# Patient Record
Sex: MALE | Race: WHITE
[De-identification: names, ages, dates, MRNs, and addresses within clinical notes are randomized per-mention and may not be internally consistent; named-entity substitution may affect disease eponyms.]

---

## 2017-10-16 ENCOUNTER — HOSPITAL ENCOUNTER (EMERGENCY)
Dept: HOSPITAL 25 - ED | Age: 82
Discharge: HOME | End: 2017-10-16
Payer: MEDICARE

## 2017-10-16 VITALS — DIASTOLIC BLOOD PRESSURE: 43 MMHG | SYSTOLIC BLOOD PRESSURE: 150 MMHG

## 2017-10-16 DIAGNOSIS — Z79.4: ICD-10-CM

## 2017-10-16 DIAGNOSIS — E11.649: Primary | ICD-10-CM

## 2017-10-16 DIAGNOSIS — R56.9: ICD-10-CM
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
## 2017-10-16 DIAGNOSIS — Z86.79: ICD-10-CM

## 2017-10-16 DIAGNOSIS — T38.3X5A: ICD-10-CM

## 2017-10-16 LAB
ALBUMIN SERPL BCG-MCNC: 3.9 G/DL (ref 3.2–5.2)
ALP SERPL-CCNC: 56 U/L (ref 34–104)
ALT SERPL W P-5'-P-CCNC: 12 U/L (ref 7–52)
ANION GAP SERPL CALC-SCNC: 9 MMOL/L (ref 2–11)
AST SERPL-CCNC: 17 U/L (ref 13–39)
BUN SERPL-MCNC: 34 MG/DL (ref 6–24)
BUN/CREAT SERPL: 23.8 (ref 8–20)
CALCIUM SERPL-MCNC: 9 MG/DL (ref 8.6–10.3)
CHLORIDE SERPL-SCNC: 105 MMOL/L (ref 101–111)
GLOBULIN SER CALC-MCNC: 3.1 G/DL (ref 2–4)
GLUCOSE SERPL-MCNC: 42 MG/DL (ref 70–100)
HCO3 SERPL-SCNC: 21 MMOL/L (ref 22–32)
HCT VFR BLD AUTO: 38 % (ref 42–52)
HGB BLD-MCNC: 12.9 G/DL (ref 14–18)
LIPASE SERPL-CCNC: 24 U/L (ref 11–82)
MAGNESIUM SERPL-MCNC: 1.7 MG/DL (ref 1.9–2.7)
MCH RBC QN AUTO: 30 PG (ref 27–31)
MCHC RBC AUTO-ENTMCNC: 34 G/DL (ref 31–36)
MCV RBC AUTO: 87 FL (ref 80–94)
POTASSIUM SERPL-SCNC: 3.1 MMOL/L (ref 3.5–5)
PROT SERPL-MCNC: 7 G/DL (ref 6.4–8.9)
RBC # BLD AUTO: 4.39 10^6/UL (ref 4–5.4)
SODIUM SERPL-SCNC: 135 MMOL/L (ref 133–145)
TSH SERPL-ACNC: 5.03 MCIU/ML (ref 0.34–5.6)
WBC # BLD AUTO: 11.7 10^3/UL (ref 3.5–10.8)

## 2017-10-16 PROCEDURE — 84443 ASSAY THYROID STIM HORMONE: CPT

## 2017-10-16 PROCEDURE — 81003 URINALYSIS AUTO W/O SCOPE: CPT

## 2017-10-16 PROCEDURE — 86140 C-REACTIVE PROTEIN: CPT

## 2017-10-16 PROCEDURE — 83605 ASSAY OF LACTIC ACID: CPT

## 2017-10-16 PROCEDURE — 83735 ASSAY OF MAGNESIUM: CPT

## 2017-10-16 PROCEDURE — 70450 CT HEAD/BRAIN W/O DYE: CPT

## 2017-10-16 PROCEDURE — 85025 COMPLETE CBC W/AUTO DIFF WBC: CPT

## 2017-10-16 PROCEDURE — 36415 COLL VENOUS BLD VENIPUNCTURE: CPT

## 2017-10-16 PROCEDURE — 80053 COMPREHEN METABOLIC PANEL: CPT

## 2017-10-16 PROCEDURE — 93005 ELECTROCARDIOGRAM TRACING: CPT

## 2017-10-16 PROCEDURE — 85610 PROTHROMBIN TIME: CPT

## 2017-10-16 PROCEDURE — 85730 THROMBOPLASTIN TIME PARTIAL: CPT

## 2017-10-16 PROCEDURE — 99283 EMERGENCY DEPT VISIT LOW MDM: CPT

## 2017-10-16 PROCEDURE — 81015 MICROSCOPIC EXAM OF URINE: CPT

## 2017-10-16 PROCEDURE — 83690 ASSAY OF LIPASE: CPT

## 2017-10-16 PROCEDURE — 83880 ASSAY OF NATRIURETIC PEPTIDE: CPT

## 2017-10-16 NOTE — RAD
Indication: First-time seizure. Hit head.



Comparison: No relevant prior exams available on the [facility] [place], [place] PACS for comparison.



Technique: Noncontrast CT vertex of skull through foramen magnum.



Report: Moderate prominence of the cerebral sulci and cerebellar fissures reflecting

atrophy. Negative for ventriculomegaly. Patent basal cisterns. Negative for gray matter

white matter obscuration, intra or extra-axial hemorrhage, or mass effect. Decreased

density in the periventricular and subcortical white matter while non-specific is most

likely due to chronic microangiopathy. 



Unremarkable orbital contents.



Atherosclerotic calcification of the intracranial internal carotid arteries and vertebral

arteries at the skull base.



No suspicious calvarial or skull base lesions evident. Negative for scalp hematoma.



IMPRESSION: 

1. No traumatic injury or acute intracranial process evident.

2. Atrophy and stigmata of chronic small vessel ischemic disease.

## 2017-10-16 NOTE — ED
Mata PATRICK Nikita, scribed for Leno Jansen MD on 10/16/17 at 0441 .





Neurological HPI





- HPI Summary


HPI Summary: 





This patient is an 83 year old M BIBA to ED with a chief complaint of seizure 

at 0230. Family found pt seizing on the kitchen floor. Per EMS, pt's BS was 31 

on arrival and they give 25gms of D10, 2nd finger stick was 71. 18 ga SL to 

left hand. The patient rates the pain 0/10 in severity. Symptoms aggravated by 

nothing. Symptoms alleviated by nothing. Family reports head trauma. Patient 

denies head pain. This was pts first seizure.





- History of Current Complaint


Chief Complaint: EDSeizure


Stated Complaint: SEIZURE


Time Seen by Provider: 10/16/17 04:32


Hx Obtained From: Patient, Family/Caretaker


Onset/Duration: Sudden Onset, Started hours ago - 0230, Resolved


Current Severity: None


Number of Seizures: 1


Pain Intensity: 0


Pain Scale Used: 0-10 Numeric


Aggravating: Nothing


Alleviating: Nothing





- Allergy/Home Medications


Allergies/Adverse Reactions: 


 Allergies











Allergy/AdvReac Type Severity Reaction Status Date / Time


 


Bacitracin [From Neosporin] Allergy  Hives Verified 07/10/13 07:32


 


Neomycin [From Neosporin] Allergy  Hives Verified 07/10/13 07:32


 


Polymyxin B [From Neosporin] Allergy  Hives Verified 07/10/13 07:32


 


IMAGING DYE Allergy  Hives Uncoded 07/10/13 07:32














PMH/Surg Hx/FS Hx/Imm Hx


Endocrine/Hematology History: Reports: Hx Diabetes - ON MEDS


Cardiovascular History: Reports: Hx Hypertension - ON MEDS


   Denies: Other Cardiovascular Problems/Disorders


Respiratory History: 


   Denies: Other Respiratory Problems/Disorders


GI History: 


   Denies: Other GI Disorders


Musculoskeletal History: 


   Denies: Other Musculoskeletal History


Sensory History: Reports: Hx Cataracts


   Denies: Hx Contacts or Glasses, Hx Hearing Aid


Opthamlomology History: Reports: Hx Cataracts


   Denies: Hx Contacts or Glasses


Neurological History: 


   Denies: Other Neuro Impairments/Disorders





- Surgical History


Surgery Procedure, Year, and Place: PROSTATE 2011, [facility] [place].  2003, LASER SURGERY 

[place], [place].  2002, BENIGN TUMOR FROM BACK, [place].  2013, RIGHT CATARACT

, [facility] [place]


Hx Anesthesia Reactions: No


Infectious Disease History: No


Infectious Disease History: 


   Denies: Traveled Outside the US in Last 30 Days





- Family History


Known Family History: Positive: Hypertension, Diabetes





- Social History


Alcohol Use: None


Substance Use Type: Reports: None


Smoking Status (MU): Never Smoked Tobacco





Review of Systems


Positive: Other - head trauma; denies head pain


Neurological: Other - seizure (pt's first one)


All Other Systems Reviewed And Are Negative: Yes





Physical Exam


Triage Information Reviewed: Yes


Vital Signs On Initial Exam: 


 Initial Vitals











Temp Pulse Resp BP Pulse Ox


 


 96.2 F   58   20   129/45   97 


 


 10/16/17 04:06  10/16/17 04:06  10/16/17 04:06  10/16/17 04:06  10/16/17 04:06











Vital Signs Reviewed: Yes


Appearance: Positive: Well-Appearing, No Pain Distress


Skin: Positive: Warm, Skin Color Reflects Adequate Perfusion, Dry


Head/Face: Positive: Normal Head/Face Inspection


Eyes: Positive: EOMI, REVA


ENT: Positive: Other - hard of hearing


Neck: Positive: Supple, Nontender


Respiratory/Lung Sounds: Positive: Clear to Auscultation, Breath Sounds Present


Cardiovascular: Positive: RRR


Abdomen Description: Positive: Nontender, Soft


Bowel Sounds: Positive: Present


Musculoskeletal: Positive: Normal, Strength/ROM Intact


Neurological: Positive: Normal, Sensory/Motor Intact, Alert, Oriented to Person 

Place, Time, CN Intact II-III


Psychiatric: Positive: Affect/Mood Appropriate





- [place] Coma Scale


Coma Scale Total: 14





Diagnostics





- Vital Signs


 Vital Signs











  Temp Pulse Resp BP Pulse Ox


 


 10/16/17 04:06  96.2 F  58  20  129/45  97














- Laboratory


Lab Results: 


 Lab Results











  10/16/17 10/16/17 10/16/17 Range/Units





  04:20 04:20 04:20 


 


WBC     (3.5-10.8)  10^3/ul


 


RBC     (4.0-5.4)  10^6/ul


 


Hgb     (14.0-18.0)  g/dl


 


Hct     (42-52)  %


 


MCV     (80-94)  fL


 


MCH     (27-31)  pg


 


MCHC     (31-36)  g/dl


 


RDW     (10.5-15)  %


 


Plt Count     (150-450)  10^3/ul


 


MPV     (7.4-10.4)  um3


 


Neut % (Auto)     (38-83)  %


 


Lymph % (Auto)     (25-47)  %


 


Mono % (Auto)     (1-9)  %


 


Eos % (Auto)     (0-6)  %


 


Baso % (Auto)     (0-2)  %


 


Absolute Neuts (auto)     (1.5-7.7)  10^3/ul


 


Absolute Lymphs (auto)     (1.0-4.8)  10^3/ul


 


Absolute Monos (auto)     (0-0.8)  10^3/ul


 


Absolute Eos (auto)     (0-0.6)  10^3/ul


 


Absolute Basos (auto)     (0-0.2)  10^3/ul


 


Absolute Nucleated RBC     10^3/ul


 


Nucleated RBC %     


 


INR (Anticoag Therapy)  0.93    (0.89-1.11)  


 


APTT  28.1    (26.0-36.3)  seconds


 


Sodium    135  (133-145)  mmol/L


 


Potassium    3.1 L  (3.5-5.0)  mmol/L


 


Chloride    105  (101-111)  mmol/L


 


Carbon Dioxide    21 L  (22-32)  mmol/L


 


Anion Gap    9  (2-11)  mmol/L


 


BUN    34 H  (6-24)  mg/dL


 


Creatinine    1.43 H  (0.67-1.17)  mg/dL


 


Est GFR ( Amer)    60.7  (>60)  


 


Est GFR (Non-Af Amer)    47.2  (>60)  


 


BUN/Creatinine Ratio    23.8 H  (8-20)  


 


Glucose    42 L  ()  mg/dL


 


POC Glucose (mg/dL)     ()  mg/dL


 


Lactic Acid     (0.5-2.0)  mmol/L


 


Calcium    9.0  (8.6-10.3)  mg/dL


 


Magnesium    1.7 L  (1.9-2.7)  mg/dL


 


Total Bilirubin    0.40  (0.2-1.0)  mg/dL


 


AST    17  (13-39)  U/L


 


ALT    12  (7-52)  U/L


 


Alkaline Phosphatase    56  ()  U/L


 


C-Reactive Protein    12.68 H  (< 5.00)  mg/L


 


B-Natriuretic Peptide   39  ( - 100) pg/mL


 


Total Protein    7.0  (6.4-8.9)  g/dL


 


Albumin    3.9  (3.2-5.2)  g/dL


 


Globulin    3.1  (2-4)  g/dL


 


Albumin/Globulin Ratio    1.3  (1-3)  


 


Lipase    24  (11.0-82.0)  U/L


 


TSH    5.03  (0.34-5.60)  mcIU/mL














  10/16/17 10/16/17 10/16/17 Range/Units





  04:20 04:20 04:56 


 


WBC  11.7 H    (3.5-10.8)  10^3/ul


 


RBC  4.39    (4.0-5.4)  10^6/ul


 


Hgb  12.9 L    (14.0-18.0)  g/dl


 


Hct  38 L    (42-52)  %


 


MCV  87    (80-94)  fL


 


MCH  30    (27-31)  pg


 


MCHC  34    (31-36)  g/dl


 


RDW  14    (10.5-15)  %


 


Plt Count  340    (150-450)  10^3/ul


 


MPV  7 L    (7.4-10.4)  um3


 


Neut % (Auto)  69.6    (38-83)  %


 


Lymph % (Auto)  18.9 L    (25-47)  %


 


Mono % (Auto)  10.0 H    (1-9)  %


 


Eos % (Auto)  0.9    (0-6)  %


 


Baso % (Auto)  0.6    (0-2)  %


 


Absolute Neuts (auto)  8.1 H    (1.5-7.7)  10^3/ul


 


Absolute Lymphs (auto)  2.2    (1.0-4.8)  10^3/ul


 


Absolute Monos (auto)  1.2 H    (0-0.8)  10^3/ul


 


Absolute Eos (auto)  0.1    (0-0.6)  10^3/ul


 


Absolute Basos (auto)  0.1    (0-0.2)  10^3/ul


 


Absolute Nucleated RBC  0.01    10^3/ul


 


Nucleated RBC %  0.1    


 


INR (Anticoag Therapy)     (0.89-1.11)  


 


APTT     (26.0-36.3)  seconds


 


Sodium     (133-145)  mmol/L


 


Potassium     (3.5-5.0)  mmol/L


 


Chloride     (101-111)  mmol/L


 


Carbon Dioxide     (22-32)  mmol/L


 


Anion Gap     (2-11)  mmol/L


 


BUN     (6-24)  mg/dL


 


Creatinine     (0.67-1.17)  mg/dL


 


Est GFR ( Amer)     (>60)  


 


Est GFR (Non-Af Amer)     (>60)  


 


BUN/Creatinine Ratio     (8-20)  


 


Glucose     ()  mg/dL


 


POC Glucose (mg/dL)    75  ()  mg/dL


 


Lactic Acid   2.1 H*   (0.5-2.0)  mmol/L


 


Calcium     (8.6-10.3)  mg/dL


 


Magnesium     (1.9-2.7)  mg/dL


 


Total Bilirubin     (0.2-1.0)  mg/dL


 


AST     (13-39)  U/L


 


ALT     (7-52)  U/L


 


Alkaline Phosphatase     ()  U/L


 


C-Reactive Protein     (< 5.00)  mg/L


 


B-Natriuretic Peptide    ( - 100) pg/mL


 


Total Protein     (6.4-8.9)  g/dL


 


Albumin     (3.2-5.2)  g/dL


 


Globulin     (2-4)  g/dL


 


Albumin/Globulin Ratio     (1-3)  


 


Lipase     (11.0-82.0)  U/L


 


TSH     (0.34-5.60)  mcIU/mL














  10/16/17 Range/Units





  06:18 


 


WBC   (3.5-10.8)  10^3/ul


 


RBC   (4.0-5.4)  10^6/ul


 


Hgb   (14.0-18.0)  g/dl


 


Hct   (42-52)  %


 


MCV   (80-94)  fL


 


MCH   (27-31)  pg


 


MCHC   (31-36)  g/dl


 


RDW   (10.5-15)  %


 


Plt Count   (150-450)  10^3/ul


 


MPV   (7.4-10.4)  um3


 


Neut % (Auto)   (38-83)  %


 


Lymph % (Auto)   (25-47)  %


 


Mono % (Auto)   (1-9)  %


 


Eos % (Auto)   (0-6)  %


 


Baso % (Auto)   (0-2)  %


 


Absolute Neuts (auto)   (1.5-7.7)  10^3/ul


 


Absolute Lymphs (auto)   (1.0-4.8)  10^3/ul


 


Absolute Monos (auto)   (0-0.8)  10^3/ul


 


Absolute Eos (auto)   (0-0.6)  10^3/ul


 


Absolute Basos (auto)   (0-0.2)  10^3/ul


 


Absolute Nucleated RBC   10^3/ul


 


Nucleated RBC %   


 


INR (Anticoag Therapy)   (0.89-1.11)  


 


APTT   (26.0-36.3)  seconds


 


Sodium   (133-145)  mmol/L


 


Potassium   (3.5-5.0)  mmol/L


 


Chloride   (101-111)  mmol/L


 


Carbon Dioxide   (22-32)  mmol/L


 


Anion Gap   (2-11)  mmol/L


 


BUN   (6-24)  mg/dL


 


Creatinine   (0.67-1.17)  mg/dL


 


Est GFR ( Amer)   (>60)  


 


Est GFR (Non-Af Amer)   (>60)  


 


BUN/Creatinine Ratio   (8-20)  


 


Glucose   ()  mg/dL


 


POC Glucose (mg/dL)  250 H  ()  mg/dL


 


Lactic Acid   (0.5-2.0)  mmol/L


 


Calcium   (8.6-10.3)  mg/dL


 


Magnesium   (1.9-2.7)  mg/dL


 


Total Bilirubin   (0.2-1.0)  mg/dL


 


AST   (13-39)  U/L


 


ALT   (7-52)  U/L


 


Alkaline Phosphatase   ()  U/L


 


C-Reactive Protein   (< 5.00)  mg/L


 


B-Natriuretic Peptide  ( - 100) pg/mL


 


Total Protein   (6.4-8.9)  g/dL


 


Albumin   (3.2-5.2)  g/dL


 


Globulin   (2-4)  g/dL


 


Albumin/Globulin Ratio   (1-3)  


 


Lipase   (11.0-82.0)  U/L


 


TSH   (0.34-5.60)  mcIU/mL











Result Diagrams: 


 10/16/17 04:20





 10/16/17 04:20


Lab Statement: Any lab studies that have been ordered have been reviewed, and 

results considered in the medical decision making process.





- CT


  ** Brain


CT Interpretation Completed By: Radiologist - No intravranial mass or bleed. 

Chronic appearing involutional changes of aging. ED physician has reviewed this 

radiology report and agrees.





- EKG


  ** 0407


Cardiac Rate: Bradycardia - 54 bpm


EKG Rhythm: Sinus Bradycardia


Ectopy: None


EKG Interpretation: T is flat in II and III AVF





Course/Dx





- Course


Assessment/Plan: This patient is an 83 year old M BIBA to ED with a chief 

complaint of seizure at 0230. Family found pt seizing on the kitchen floor. Per 

EMS, pt's BS was 31 on arrival and they give 25gms of D10, 2nd finger stick was 

71. 18 ga SL to left hand. The patient rates the pain 0/10 in severity. 

Symptoms aggravated by nothing. Symptoms alleviated by nothing. Family reports 

head trauma. Patient denies head pain. Brain CT reveals no intravranial mass or 

bleed. Chronic appearing involutional changes of aging. ED physician has 

reviewed this radiology report and agrees. In the ED course, pt was given fluids

, food and drinks. Medications reviewed. Allergies noted.  IMPROVED IN ED.  F/U 

PMD; RETURN IF WORSE.





- Diagnoses


Provider Diagnoses: 


 Hypoglycemia due to insulin








Discharge





- Discharge Plan


Condition: Stable


Disposition: HOME


Patient Education Materials:  Hypoglycemia in a Person with Diabetes (ED)


Referrals: 


Molina Acuña NP [Primary Care Provider] - 


Additional Instructions: 


FOLLOW UP WITH YOUR DOCTOR.


RETURN TO THE EMERGENCY DEPARTMENT FOR ANY WORSENING OF YOUR CONDITION OR 

QUESTIONS OR CONCERNS.





The documentation as recorded by the Mata holt Nikita accurately reflects the 

service I personally performed and the decisions made by me, Leno Jansen MD.

## 2017-10-17 NOTE — ED
Ayaan PATRICK Alfonso, scribed for Kehinde Venegas MD on 10/16/17 at 0932 .





Progress





- Progress Note


Progress Note: 





Reevaluation at 0924: 


No pain. No complains. Blood sugar 310. Reviewed labs with patient and family. 

Recommended he eat a banana and avocado today. He and his family understand and 

agree.





Course/Dx





- Diagnoses


Provider Diagnoses: 


 Hypoglycemia due to insulin








The documentation as recorded by the Ayaan holt Alfonso accurately reflects 

the service I personally performed and the decisions made by me, Kehinde Venegas MD.

## 2017-11-14 ENCOUNTER — HOSPITAL ENCOUNTER (INPATIENT)
Dept: HOSPITAL 25 - ED | Age: 82
LOS: 3 days | Discharge: NURSING, REHAB OR LONG-TERM CARE | DRG: 638 | End: 2017-11-17
Attending: INTERNAL MEDICINE | Admitting: HOSPITALIST
Payer: MEDICARE

## 2017-11-14 DIAGNOSIS — Z87.891: ICD-10-CM

## 2017-11-14 DIAGNOSIS — Z79.4: ICD-10-CM

## 2017-11-14 DIAGNOSIS — E11.22: ICD-10-CM

## 2017-11-14 DIAGNOSIS — E11.3299: ICD-10-CM

## 2017-11-14 DIAGNOSIS — Z80.2: ICD-10-CM

## 2017-11-14 DIAGNOSIS — I12.9: ICD-10-CM

## 2017-11-14 DIAGNOSIS — R41.0: ICD-10-CM

## 2017-11-14 DIAGNOSIS — E11.65: Primary | ICD-10-CM

## 2017-11-14 DIAGNOSIS — Z88.1: ICD-10-CM

## 2017-11-14 DIAGNOSIS — R26.9: ICD-10-CM

## 2017-11-14 DIAGNOSIS — N40.0: ICD-10-CM

## 2017-11-14 DIAGNOSIS — R74.8: ICD-10-CM

## 2017-11-14 DIAGNOSIS — D64.9: ICD-10-CM

## 2017-11-14 DIAGNOSIS — T50.2X5A: ICD-10-CM

## 2017-11-14 DIAGNOSIS — Z98.41: ICD-10-CM

## 2017-11-14 DIAGNOSIS — R33.9: ICD-10-CM

## 2017-11-14 DIAGNOSIS — Z83.3: ICD-10-CM

## 2017-11-14 DIAGNOSIS — N18.3: ICD-10-CM

## 2017-11-14 DIAGNOSIS — F41.9: ICD-10-CM

## 2017-11-14 DIAGNOSIS — Z82.49: ICD-10-CM

## 2017-11-14 DIAGNOSIS — Z79.82: ICD-10-CM

## 2017-11-14 DIAGNOSIS — Z91.041: ICD-10-CM

## 2017-11-14 DIAGNOSIS — N17.9: ICD-10-CM

## 2017-11-14 DIAGNOSIS — R00.1: ICD-10-CM

## 2017-11-14 DIAGNOSIS — E83.42: ICD-10-CM

## 2017-11-14 DIAGNOSIS — E11.40: ICD-10-CM

## 2017-11-14 DIAGNOSIS — E78.5: ICD-10-CM

## 2017-11-14 DIAGNOSIS — H91.90: ICD-10-CM

## 2017-11-14 DIAGNOSIS — E87.1: ICD-10-CM

## 2017-11-14 LAB
ALBUMIN SERPL BCG-MCNC: 3.5 G/DL (ref 3.2–5.2)
ALP SERPL-CCNC: 57 U/L (ref 34–104)
ALT SERPL W P-5'-P-CCNC: 15 U/L (ref 7–52)
ANION GAP SERPL CALC-SCNC: 7 MMOL/L (ref 2–11)
AST SERPL-CCNC: 25 U/L (ref 13–39)
BUN SERPL-MCNC: 43 MG/DL (ref 6–24)
BUN/CREAT SERPL: 29.7 (ref 8–20)
CALCIUM SERPL-MCNC: 8.4 MG/DL (ref 8.6–10.3)
CHLORIDE SERPL-SCNC: 91 MMOL/L (ref 101–111)
CK SERPL-CCNC: 461 U/L (ref 10–223)
GLOBULIN SER CALC-MCNC: 3.3 G/DL (ref 2–4)
GLUCOSE SERPL-MCNC: 369 MG/DL (ref 70–100)
HCO3 SERPL-SCNC: 22 MMOL/L (ref 22–32)
HCT VFR BLD AUTO: 36 % (ref 42–52)
HGB BLD-MCNC: 12.4 G/DL (ref 14–18)
MAGNESIUM SERPL-MCNC: 1.3 MG/DL (ref 1.9–2.7)
MCH RBC QN AUTO: 29 PG (ref 27–31)
MCHC RBC AUTO-ENTMCNC: 34 G/DL (ref 31–36)
MCV RBC AUTO: 86 FL (ref 80–94)
POTASSIUM SERPL-SCNC: 4.1 MMOL/L (ref 3.5–5)
PROT SERPL-MCNC: 6.8 G/DL (ref 6.4–8.9)
RBC # BLD AUTO: 4.21 10^6/UL (ref 4–5.4)
SODIUM SERPL-SCNC: 120 MMOL/L (ref 133–145)
TROPONIN I SERPL-MCNC: 0.07 NG/ML (ref ?–0.04)
WBC # BLD AUTO: 9.6 10^3/UL (ref 3.5–10.8)

## 2017-11-14 PROCEDURE — 82550 ASSAY OF CK (CPK): CPT

## 2017-11-14 PROCEDURE — 80048 BASIC METABOLIC PNL TOTAL CA: CPT

## 2017-11-14 PROCEDURE — 71010: CPT

## 2017-11-14 PROCEDURE — 81003 URINALYSIS AUTO W/O SCOPE: CPT

## 2017-11-14 PROCEDURE — 80051 ELECTROLYTE PANEL: CPT

## 2017-11-14 PROCEDURE — 80053 COMPREHEN METABOLIC PANEL: CPT

## 2017-11-14 PROCEDURE — 83735 ASSAY OF MAGNESIUM: CPT

## 2017-11-14 PROCEDURE — 85025 COMPLETE CBC W/AUTO DIFF WBC: CPT

## 2017-11-14 PROCEDURE — 70450 CT HEAD/BRAIN W/O DYE: CPT

## 2017-11-14 PROCEDURE — 83935 ASSAY OF URINE OSMOLALITY: CPT

## 2017-11-14 PROCEDURE — 83036 HEMOGLOBIN GLYCOSYLATED A1C: CPT

## 2017-11-14 PROCEDURE — 36415 COLL VENOUS BLD VENIPUNCTURE: CPT

## 2017-11-14 PROCEDURE — 84300 ASSAY OF URINE SODIUM: CPT

## 2017-11-14 PROCEDURE — 93005 ELECTROCARDIOGRAM TRACING: CPT

## 2017-11-14 PROCEDURE — 82553 CREATINE MB FRACTION: CPT

## 2017-11-14 PROCEDURE — 81015 MICROSCOPIC EXAM OF URINE: CPT

## 2017-11-14 PROCEDURE — 87086 URINE CULTURE/COLONY COUNT: CPT

## 2017-11-14 PROCEDURE — 84484 ASSAY OF TROPONIN QUANT: CPT

## 2017-11-14 PROCEDURE — 85027 COMPLETE CBC AUTOMATED: CPT

## 2017-11-14 NOTE — ED
Ayaan PATRICK Alfonso, scribed for Leno Jansen MD on 11/14/17 at 2101 .





Complex/Multi-Sys Presentation





- HPI Summary


HPI Summary: 


 This patient is an 83 year old M BIBA to [facility] referred by Dr. Mead (PCP) 

accompanied by family with a chief complaint of high blood sugar of 526 noted 

earlier today. The patient rates the pain 0/10 in severity. Patient reports 

dizziness, fatigue, and generalized weakness. Son reports confusion (worse at 

night). PCP reports disorientation, hyponatremia, HTN, anxiety, and cardiac 

arrhythmia. Patient denies fever, rhinorrhea, sore throat, SOB, cough, chest 

congestion, abdominal pain, bowel symptoms, and urinary symptoms, and rash.





- History Of Current Complaint


Chief Complaint: EDDiabeticProb


Time Seen by Provider: 11/14/17 20:54


Hx Obtained From: Patient, Family/Caretaker, Medical Records


Onset/Duration: Gradual Onset


Timing: Constant


Severity Currently: Moderate


Associated Signs And Symptoms: Positive: Other - Patient reports dizziness, 

fatigue, and generalized weakness. Son reports confusion (worse at night). PCP 

reports disorientation, hyponatremia, HTN, anxiety, and cardiac arrhythmia. 

Patient denies fever, rhinorrhea, sore throat, SOB, cough, chest congestion, 

abdominal pain, bowel symptoms, and urinary symptoms, and rash.





- Allergies/Home Medications


Allergies/Adverse Reactions: 


 Allergies











Allergy/AdvReac Type Severity Reaction Status Date / Time


 


Bacitracin [From Neosporin] Allergy  Hives Verified 07/10/13 07:32


 


Neomycin [From Neosporin] Allergy  Hives Verified 07/10/13 07:32


 


Polymyxin B [From Neosporin] Allergy  Hives Verified 07/10/13 07:32


 


IMAGING DYE Allergy  Hives Uncoded 07/10/13 07:32














PMH/Surg Hx/FS Hx/Imm Hx


Endocrine/Hematology History: Reports: Hx Diabetes - ON MEDS


Cardiovascular History: Reports: Hx Hypertension - ON MEDS


   Denies: Other Cardiovascular Problems/Disorders


Respiratory History: 


   Denies: Other Respiratory Problems/Disorders


GI History: 


   Denies: Other GI Disorders


Musculoskeletal History: 


   Denies: Other Musculoskeletal History


Sensory History: Reports: Hx Cataracts


   Denies: Hx Contacts or Glasses, Hx Hearing Aid


Opthamlomology History: Reports: Hx Cataracts


   Denies: Hx Contacts or Glasses


Neurological History: 


   Denies: Other Neuro Impairments/Disorders





- Surgical History


Surgery Procedure, Year, and Place: PROSTATE 2011, [facility].  2003, LASER SURGERY 

[place], [place].  2002, BENIGN TUMOR FROM BACK, [place].  2013, RIGHT CATARACT

, [facility]


Hx Anesthesia Reactions: No


Infectious Disease History: No


Infectious Disease History: 


   Denies: Traveled Outside the US in Last 30 Days





- Family History


Known Family History: Positive: Hypertension, Diabetes





- Social History


Alcohol Use: None


Substance Use Type: Reports: None


Smoking Status (MU): Never Smoked Tobacco





Review of Systems


Positive: Fatigue, Other - High blood sugar, hyponatremia, HTN.  Negative: Fever


Negative: Sore Throat, Nasal Discharge


Positive: Other - cardiac arrhythmia


Positive: Other - negative chest congestion.  Negative: Shortness Of Breath, 

Cough


Positive: Other - Negative bowel symptoms.  Negative: Abdominal Pain


Positive: no symptoms reported


Negative: Rash


Neurological: Other - dizziness, disorientation


Positive: Weakness


Psychological: Other - confusion


Positive: Anxious


All Other Systems Reviewed And Are Negative: Yes





Physical Exam





- Summary


Physical Exam Summary: 





General: well-appearing, no pain distress


Skin: warm, color reflects adequate perfusion, dry


Head: normal


Eyes: EOMI, REVA


ENT: normal


Neck: supple, nontender


Respiratory: CTA, breath sounds present


Cardiovascular: RRR


Abdomen: soft, nontender


Bowel: present


Musculoskeletal: normal, strength/ROM intact


Neurological: normal, sensory/motor intact, A&O x3, No focal neurological 

deficits.


Psychological: Mildly confused.


Triage Information Reviewed: Yes


Vital Signs On Initial Exam: 


 Initial Vitals











Temp Pulse Resp BP Pulse Ox


 


 99.2 F   83   16   115/44   95 


 


 11/14/17 20:30  11/14/17 20:30  11/14/17 20:30  11/14/17 20:30  11/14/17 20:30











Vital Signs Reviewed: Yes





- [place] Coma Scale


Best Eye Response: 4 - Spontaneous


Best Motor Response: 6 - Obeys Commands


Best Verbal Response: 4 - Confused


Coma Scale Total: 15


[place] Coma Scale Comments: 14/15





Diagnostics





- Vital Signs


 Vital Signs











  Temp Pulse Resp BP Pulse Ox


 


 11/14/17 20:30  99.2 F  83  16  115/44  95














- Laboratory


Lab Results: 


 Lab Results











  11/14/17 11/14/17 Range/Units





  20:22 20:22 


 


WBC  9.6   (3.5-10.8)  10^3/ul


 


RBC  4.21   (4.0-5.4)  10^6/ul


 


Hgb  12.4 L   (14.0-18.0)  g/dl


 


Hct  36 L   (42-52)  %


 


MCV  86   (80-94)  fL


 


MCH  29   (27-31)  pg


 


MCHC  34   (31-36)  g/dl


 


RDW  14   (10.5-15)  %


 


Plt Count  246   (150-450)  10^3/ul


 


MPV  7 L   (7.4-10.4)  um3


 


Neut % (Auto)  88.4 H   (38-83)  %


 


Lymph % (Auto)  3.0 L   (25-47)  %


 


Mono % (Auto)  7.7   (1-9)  %


 


Eos % (Auto)  0   (0-6)  %


 


Baso % (Auto)  0.9   (0-2)  %


 


Absolute Neuts (auto)  8.5 H   (1.5-7.7)  10^3/ul


 


Absolute Lymphs (auto)  0.3 L   (1.0-4.8)  10^3/ul


 


Absolute Monos (auto)  0.7   (0-0.8)  10^3/ul


 


Absolute Eos (auto)  0   (0-0.6)  10^3/ul


 


Absolute Basos (auto)  0.1   (0-0.2)  10^3/ul


 


Absolute Nucleated RBC  0   10^3/ul


 


Nucleated RBC %  0   


 


Sodium   120 L  (133-145)  mmol/L


 


Potassium   4.1  (3.5-5.0)  mmol/L


 


Chloride   91 L  (101-111)  mmol/L


 


Carbon Dioxide   22  (22-32)  mmol/L


 


Anion Gap   7  (2-11)  mmol/L


 


BUN   43 H  (6-24)  mg/dL


 


Creatinine   1.45 H  (0.67-1.17)  mg/dL


 


Est GFR ( Amer)   59.8  (>60)  


 


Est GFR (Non-Af Amer)   46.5  (>60)  


 


BUN/Creatinine Ratio   29.7 H  (8-20)  


 


Glucose   369 H  ()  mg/dL


 


Calcium   8.4 L  (8.6-10.3)  mg/dL


 


Magnesium   1.3 L  (1.9-2.7)  mg/dL


 


Total Bilirubin   0.50  (0.2-1.0)  mg/dL


 


AST   25  (13-39)  U/L


 


ALT   15  (7-52)  U/L


 


Alkaline Phosphatase   57  ()  U/L


 


Total Creatine Kinase   461 H  ()  U/L


 


CK-MB (CK-2)   Pending  


 


Troponin I   Pending  


 


Total Protein   6.8  (6.4-8.9)  g/dL


 


Albumin   3.5  (3.2-5.2)  g/dL


 


Globulin   3.3  (2-4)  g/dL


 


Albumin/Globulin Ratio   1.1  (1-3)  











Result Diagrams: 


 11/14/17 20:22





 11/14/17 20:22


Lab Statement: Any lab studies that have been ordered have been reviewed, and 

results considered in the medical decision making process.





- Radiology


  ** CXR


Radiology Interpretation Completed By: Radiologist - No evidence for acute 

intrathoracic disease. ED physician has reviewed this radiology report and 

agrees.





- CT


  ** Brain


CT Interpretation Completed By: Radiologist - Involutional change and stigmata 

of chronic small vessel ischemic disease similar to the prior exam. No acute 

intracranial process evident. ED physician has reviewed this radiology report 

and agrees.





- EKG


  ** 2042


Cardiac Rate: NL


EKG Rhythm: Sinus Rhythm - BPM 79


ST Segment: Normal


EKG Interpretation: Prolonged NC interval of 232. Sinus pause.





Complex Multi-Symp Course/Dx


Course Of Treatment: ADMIT HOSPITALIST.





- Diagnoses


Provider Diagnoses: 


 Hyponatremia, Confusion, Hyperglycemia, Hypomagnesemia, Elevated troponin, 

Renal insufficiency








- Physician Notifications


Discussed Care Of Patient With: Naye Lanza


Time Discussed With Above Provider: 21:15


Instructed by Provider To: Other - Consulted Dr. Lanza (hospitalist) who 

agrees to admit.





- Critical Care Time


Critical Care Time: 30-74 min





Discharge





- Discharge Plan


Condition: Stable


Disposition: ADMITTED TO [facility]


Referrals: 


Molina Acuña NP [Primary Care Provider] - 





The documentation as recorded by the Ayaan holt Alfonso accurately reflects 

the service I personally performed and the decisions made by me, Leno Jansen MD.

## 2017-11-14 NOTE — RAD
Indication: Confusion.



Comparison: October 16, 2017 CT.



Technique: Noncontrast CT vertex of skull through foramen magnum.



Report: Moderate prominence of the cerebral sulci and mild prominence of the cerebellar

fissures reflecting atrophy. Negative for gray matter white matter obscuration, intra or

extra-axial hemorrhage, or mass effect. Decreased density in the periventricular and

subcortical white matter while non-specific is most likely due to chronic microangiopathy.

Atherosclerotic calcification of the large central intracranial arteries and vertebral

arteries at the skull base. Unremarkable orbital contents.



Clear visualized paranasal sinuses and mastoid air spaces.



Unremarkable calvarium and skull base. Negative for scalp lesions.



IMPRESSION: Involutional change and stigmata of chronic small vessel ischemic disease

similar to the prior exam. No acute intracranial process evident.

## 2017-11-14 NOTE — RAD
Indication: Altered mental status. Elevated blood glucose today.



Comparison: March 09, 2015



Technique: Sitting AP chest



Report: Suboptimal inspiration compared with the prior exam with resulting crowding of the

pulmonary markings. Negative for pulmonary infiltrate, pleural effusion, pneumothorax.

Negative for cardiomegaly. Unremarkable central pulmonary vasculature and mediastinal

contours.



IMPRESSION:  No evidence for acute intrathoracic disease.

## 2017-11-15 LAB
ANION GAP SERPL CALC-SCNC: 7 MMOL/L (ref 2–11)
ANION GAP SERPL CALC-SCNC: 9 MMOL/L (ref 2–11)
BUN SERPL-MCNC: 45 MG/DL (ref 6–24)
BUN/CREAT SERPL: 30.6 (ref 8–20)
CALCIUM SERPL-MCNC: 8.3 MG/DL (ref 8.6–10.3)
CHLORIDE SERPL-SCNC: 90 MMOL/L (ref 101–111)
CHLORIDE SERPL-SCNC: 95 MMOL/L (ref 101–111)
GLUCOSE SERPL-MCNC: 247 MG/DL (ref 70–100)
HCO3 SERPL-SCNC: 21 MMOL/L (ref 22–32)
HCO3 SERPL-SCNC: 21 MMOL/L (ref 22–32)
HCT VFR BLD AUTO: 35 % (ref 42–52)
HGB BLD-MCNC: 11.8 G/DL (ref 14–18)
MAGNESIUM SERPL-MCNC: 2 MG/DL (ref 1.9–2.7)
MCH RBC QN AUTO: 29 PG (ref 27–31)
MCHC RBC AUTO-ENTMCNC: 34 G/DL (ref 31–36)
MCV RBC AUTO: 86 FL (ref 80–94)
POTASSIUM SERPL-SCNC: 4 MMOL/L (ref 3.5–5)
POTASSIUM SERPL-SCNC: 4.2 MMOL/L (ref 3.5–5)
RBC # BLD AUTO: 4.04 10^6/UL (ref 4–5.4)
SODIUM SERPL-SCNC: 120 MMOL/L (ref 133–145)
SODIUM SERPL-SCNC: 123 MMOL/L (ref 133–145)
TROPONIN I SERPL-MCNC: 0.07 NG/ML (ref ?–0.04)
WBC # BLD AUTO: 7.7 10^3/UL (ref 3.5–10.8)

## 2017-11-15 RX ADMIN — GLIPIZIDE SCH MG: 5 TABLET ORAL at 17:16

## 2017-11-15 RX ADMIN — SODIUM CHLORIDE SCH MLS/HR: 900 IRRIGANT IRRIGATION at 08:35

## 2017-11-15 RX ADMIN — INSULIN LISPRO SCH UNIT: 100 INJECTION, SOLUTION INTRAVENOUS; SUBCUTANEOUS at 08:36

## 2017-11-15 RX ADMIN — HEPARIN SODIUM SCH UNITS: 5000 INJECTION INTRAVENOUS; SUBCUTANEOUS at 06:04

## 2017-11-15 RX ADMIN — SODIUM CHLORIDE SCH MLS/HR: 900 IRRIGANT IRRIGATION at 23:17

## 2017-11-15 RX ADMIN — SODIUM CHLORIDE SCH MLS/HR: 900 IRRIGANT IRRIGATION at 06:04

## 2017-11-15 RX ADMIN — EZETIMIBE SCH MG: 10 TABLET ORAL at 08:36

## 2017-11-15 RX ADMIN — INSULIN LISPRO SCH: 100 INJECTION, SOLUTION INTRAVENOUS; SUBCUTANEOUS at 21:26

## 2017-11-15 RX ADMIN — INSULIN LISPRO SCH UNIT: 100 INJECTION, SOLUTION INTRAVENOUS; SUBCUTANEOUS at 12:47

## 2017-11-15 RX ADMIN — ASPIRIN SCH MG: 81 TABLET, CHEWABLE ORAL at 08:36

## 2017-11-15 RX ADMIN — ATENOLOL SCH MG: 25 TABLET ORAL at 08:36

## 2017-11-15 RX ADMIN — HEPARIN SODIUM SCH UNITS: 5000 INJECTION INTRAVENOUS; SUBCUTANEOUS at 21:22

## 2017-11-15 RX ADMIN — HEPARIN SODIUM SCH UNITS: 5000 INJECTION INTRAVENOUS; SUBCUTANEOUS at 15:12

## 2017-11-15 RX ADMIN — INSULIN LISPRO SCH: 100 INJECTION, SOLUTION INTRAVENOUS; SUBCUTANEOUS at 17:06

## 2017-11-15 NOTE — HP
CC:  Molina Acuña NP *

 

HISTORY AND PHYSICAL:

 

DATE OF ADMISSION:  17

 

PRIMARY CARE PROVIDER:  Molina Acuña NP.

 

CHIEF COMPLAINT:  Elevated blood sugar.

 

HISTORY OF PRESENT ILLNESS:  Mr. Pelaez is an 83-year-old male who has a 
history of type 2 diabetes and hypertension, who went to see Dr. Mead on the 
day of admission for concerns of elevated blood pressure and blood glucose.  
The patient was visited by his physical therapist early on the morning of 
admission.  At the time of physical therapy evaluation, the patient was noted 
to be hypertensive and have an elevated blood sugar, because of this an 
appointment was made with Dr. Mead for an acute visit.  At that visit, blood 
work was obtained, which revealed patient's sodium to be low at 125.  
Recommendation was made to discontinue the triamterene/hydrochlorothiazide 
medication and started amlodipine instead for blood pressure.  The patient was 
taken home and ate a hamburger on a slice of bread.  In addition, the patient 
had cheese and crackers at the doctor's office.  The patient then took his 
blood sugar and it was elevated around 500.  Because of this he contacted his 
primary care provider's office and they referred him to the emergency room for 
evaluation.  The patient currently is very sleepy.  He feels very thirsty and 
his biggest complaint is that he is getting cramps in his right calf, which he 
thinks is due to lack of water.  The patient was last seen in the emergency 
room on 10/16/17 after he had a seizure at home.  It was felt this is likely 
related to severe hyperglycemia.  Since that time, the patient has been highly 
anxious and sleeping very poorly, his son believes, because he is concerned 
about the events that happened on 10/16/17.  The patient is unsteady on his feet
; however, this is chronic and essentially unchanged.

 

PAST MEDICAL HISTORY:

1.  Hypertension.

2.  Diabetic neuropathy.

3.  BPH.

4.  Diabetic retinopathy.

5.  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

6.  Hyperlipidemia.

7.  Type 2 diabetes - uncontrolled. PAST SURGICAL HISTORY:

1.  Tonsillectomy.

2.  Cyst removal of back.

3.  TURP.

4.  Umbilical hernia repair.

 

MEDICATIONS:

1.  Triamterene/hydrochlorothiazide 37.5/25 one tab p.o. daily.

2.  MiraLAX 17 g p.o. daily.

3.  Fish oil 500 mg p.o. daily.

4.  Tylenol 325 mg p.o. q.4 hours p.r.n. pain.

5.  Atenolol 50 mg p.o. daily.

6.  Metformin 500 mg p.o. twice daily.

7.  Micardis 80 mg p.o. daily.

8.  Lovaza 1 cap p.o. b.i.d.

9.  Lantus 25 units subcutaneous daily.

10.  Zetia 10 mg p.o. daily.

11.  Aspirin 81 mg p.o. daily.

 

(Medication list that the patient has does not match medication list from Dr. Mead's most recent visit.  The patient's son will obtain accurate medication 
list and dosages.)

 

ALLERGIES:  BACITRACIN, NEOMYCIN, POLYMYXIN-B, and CONTRAST DYE.

 

FAMILY HISTORY:  Mom is , she had diabetes.  Dad is also , he 
had emphysema.

 

SOCIAL HISTORY:  The patient is a former smoker.  He quit in his 40s to 50s.  
He denies routine alcohol use.  He worked as a  for [employer].  He is 
.  He has 6 children.  His son, Billy, is his healthcare proxy.

 

REVIEW OF SYSTEMS:  The patient denies any fevers, chills, no chest pain, no 
shortness of breath.  No nausea, vomiting, diarrhea or abdominal pain.  No 
constipation.  No blood in the stool.  No blood in the urine.  No focal weakness
, though he is unsteady on his feet.  Rest of the review of systems is negative.

 

                               PHYSICAL EXAMINATION

 

GENERAL:  The patient is a well-developed elderly male, lying on the stretcher, 
in no acute distress.

 

VITAL SIGNS:  Blood pressure 97/68, pulse 77, respirations 24, temp 99.2, O2 
sat 96% on room air.

 

HEENT:  Pupils are equal and round.  Extraocular muscles are intact.  
Oropharynx is dry.  The patient wears dentures.  There is no submandibular, 
cervical or supraclavicular adenopathy.  Thyroid is not enlarged.  No thyroid 
nodules noted.

 

PULMONARY:  Lungs are clear with few bibasilar crackles.

 

CARDIAC:  Normal S1, S2.  Regular rate and rhythm.  I do not appreciate any 
murmurs.

 

ABDOMEN:  Bowel sounds present.  Abdomen is soft, nontender, nondistended.

 

MUSCULOSKELETAL:  There is no cyanosis or clubbing of the digits.  There is 
full active range of motion of all 4 extremities.

 

SKIN:  Warm and dry.  There are no rashes.

 

NEUROLOGIC:  Cranial nerves II through XII are grossly intact.  Sensation is 
intact to light touch throughout.  Strength is 5/5 and symmetric in both upper 
and lower extremities bilaterally.

 

PSYCH:  The patient is alert.  He has hard of hearing.  He is able to answer 
most questions appropriately.

 

 LABORATORY DATA:  WBC 9.6, hemoglobin 12.4, hematocrit 36, platelets 246.  
Sodium 120, potassium 4.1, chloride 91, CO2 of 22, BUN 43, creatinine 1.45, 
glucose 369, calcium 8.4, magnesium 1.3.  Bilirubin 0.5, AST 25, ALT 15, alk 
phos 57.  , CK-MB 6.7.  Troponin 0.07.  Albumin 3.5.

 

Urinalysis reveals cloudy urine with a specific gravity of 1.013, 2+ blood, 2+ 
leukocyte esterase, absent bacteria, 3+ glucose.

 

EKG reveals normal sinus rhythm with prolonged ME interval, but no acute ST-T 
wave abnormalities.  There are, what appear to be, potentially biphasic T waves 
in the inferior leads.

 

Chest x-ray:  No evidence for acute intrathoracic disease.

 

CT brain:  Involutional change and stigmata of chronic small vessel ischemic 
disease, similar to the prior exam.  No acute intracranial process evident.

 

ASSESSMENT AND PLAN:  Mr. Pelaez is an 83-year-old male who has history of 
type 2 diabetes, hypertension, retinopathy, nephropathy, and neuropathy related 
to his diabetes and chronic instability on his feet, who presents to the 
emergency room with complaints of elevated blood glucose.

 

1.  Hyperglycemia:  The patient's blood sugar was taken immediately after 
eating. Therefore, I question how significant the elevation is.  However, if 
his blood sugar was truly around 500, that is quite significant.  The home dose 
of his Lantus and metformin are not completely clear.  I am going to hold  his 
metformin for now.  I will continue Lantus 25 units subcutaneous daily along 
with the lispro sliding scale.  Hemoglobin A1c was last checked in 2017 and it was elevated at 8.7%.  I have ordered repeat hemoglobin A1c to be 
added to the labs drawn in the emergency room.

2.  Hyponatremia:  I suspect some of the hyponatremia may be pseudohyponatremia 
related to patient's hyperglycemia.  The patient does appear to be likely 
volume depleted given his very dry oral mucosa.  Additionally, he has been on 
triamterene/hydrochlorothiazide, which can lead to hyponatremia.  He is 
receiving normal saline at 100 mL per hour and we will get followup 
electrolytes now. Additionally, I will send a urine sodium and urine osmolality 
to help delineate the etiology of the hyponatremia and need to be cautious to 
not raise the sodium level too quickly.

3.  Elevated troponin:  It is unclear why the patient's troponin is elevated at 
this point.  He denies any chest pain.  The patient's son tells me that there 
was concern of atrial fibrillation  at the  doctor's office earlier today.  
Perhaps he has been in and out of rapid atrial fibrillation.  That could lead 
to elevated troponin.  For now, I will follow this.  I will not do any further 
cardiac testing or start a heparin drip at this point if it is unclear why the 
troponin is elevated.

4.  Stage 3 chronic kidney disease:  The patient's creatinine is essentially at 
baseline.  This will be monitored.

5.  Hypomagnesemia:  The patient received 2 g of magnesium in the emergency 
room. A followup magnesium level will be obtained tomorrow.

6.  Anemia:  The patient is chronically anemic.  However, he is slightly lower 
than usual.  We will need to follow this.

7.  Type 2 diabetes:  As above, the patient is going to be maintained on his 
usual dose of Lantus and lispro sliding scale and his metformin will be held 
for now.

8.  Hypertension:  The patient's blood pressure is low normal at this point.  I 
am going to continue his atenolol and Micardis, but again the doses of these 
are not completely clear.  The triamterene/hydrochlorothiazide has been 
discontinued.  He was started on amlodipine; however, he is yet to take a dose 
of this and, therefore, I will hold this for now.

9.  Hyperlipidemia:  The patient will continue his usual dose of Zetia.

10.  DVT prophylaxis:  According to the Adult Thrombosis Prophylaxis Risk 
Factor Assessment Guide, the patient has a total risk factor score of 5, making 
him the highest risk.  He will be placed on heparin 5000 units subcutaneous q.8 
hours.

11.  Code status is full and, again, the patient's son, Billy, is his healthcare 
proxy.

 

TIME SPENT:  Sixty-five minutes were spent admitting this patient.

 

[id number]/CPS #: [id number]

[facility]

## 2017-11-15 NOTE — PN
Subjective


Date of Service: 11/15/17


Interval History: 





No c/o.





Objective


Active Medications: 








Aspirin (Aspirin Low Dose Tab*)  81 mg PO DAILY [facility]


   Last Admin: 11/15/17 08:36 Dose:  81 mg


Atenolol (Tenormin Tab*)  25 mg PO DAILY [facility]


   Last Admin: 11/15/17 08:36 Dose:  25 mg


Dextrose (D50w Syringe 50 Ml*)  12.5 gm IV PUSH .FOR FS < 60 - SS PRN


   PRN Reason: FS < 60


Ezetimibe (Zetia Tab*)  10 mg PO DAILY [facility]


   Last Admin: 11/15/17 08:36 Dose:  10 mg


Glipizide (Glucotrol Tab*)  5 mg PO 0800,1700 [facility]


Heparin Sodium (Porcine) (Heparin Vial(*))  5,000 units SUBCUT Q8HR [facility]


   Last Admin: 11/15/17 06:04 Dose:  5,000 units


Sodium Chloride (Ns 0.9% 1000 Ml*)  1,000 mls @ 75 mls/hr IV PER RATE [facility]


   Last Admin: 11/15/17 08:35 Dose:  75 mls/hr


Insulin Glargine (Lantus(*))  25 units SUBCUT DAILY [facility]


   Last Admin: 11/15/17 08:36 Dose:  25 unit


Insulin Human Lispro (Humalog*)  0 units SUBCUT ACHS [facility]


   PRN Reason: Protocol


   Last Admin: 11/15/17 08:36 Dose:  4 unit


Losartan Potassium (Cozaar Tab*)  50 mg PO DAILY [facility]


   Last Admin: 11/15/17 08:36 Dose:  50 mg








 Vital Signs











  11/15/17 11/15/17 11/15/17





  00:03 00:04 04:04


 


Temperature 99.7 F  97.7 F


 


Pulse Rate 87 95 69


 


Respiratory 16 16 20





Rate   


 


Blood Pressure 148/72 97/56 127/38





(mmHg)   


 


O2 Sat by Pulse 94 96 97





Oximetry   














  11/15/17 11/15/17





  07:30 08:00


 


Temperature 97.1 F 


 


Pulse Rate 77 


 


Respiratory 22 18





Rate  


 


Blood Pressure 166/83 





(mmHg)  


 


O2 Sat by Pulse 95 





Oximetry  











Oxygen Devices in Use Now: None


Appearance: Alert, supine in bed.  Neutral affect, passive.  Looks comfortable.


Respiratory: Symmetrical Chest Expansion and Respiratory Effort, Clear to 

Auscultation, Clear to Percussion


Cardiovascular: NL Sounds; No Murmurs; No JVD, RRR, No Edema


Abdominal: NL Sounds; No Tenderness; No Distention, No Hepatosplenomegaly, -


Extremities: No Edema, No Clubbing, Cyanosis, -


Skin: No Rash or Ulcers, No Nodules or Sclerosis, -


Neurological: NL Sensation - Diminished hearing, L hearing aide absent due to 

failed battery not yet replaced. Passive but cooperative.   No tremor. 


Result Diagrams: 


 11/15/17 07:11





 11/15/17 07:11


Additional Lab and Data: 


 Lab Results











  11/14/17 11/14/17 Range/Units





  20:22 20:22 


 


WBC  9.6   (3.5-10.8)  10^3/ul


 


RBC  4.21   (4.0-5.4)  10^6/ul


 


Hgb  12.4 L   (14.0-18.0)  g/dl


 


Hct  36 L   (42-52)  %


 


MCV  86   (80-94)  fL


 


MCH  29   (27-31)  pg


 


MCHC  34   (31-36)  g/dl


 


RDW  14   (10.5-15)  %


 


Plt Count  246   (150-450)  10^3/ul


 


MPV  7 L   (7.4-10.4)  um3


 


Neut % (Auto)  88.4 H   (38-83)  %


 


Lymph % (Auto)  3.0 L   (25-47)  %


 


Mono % (Auto)  7.7   (1-9)  %


 


Eos % (Auto)  0   (0-6)  %


 


Baso % (Auto)  0.9   (0-2)  %


 


Absolute Neuts (auto)  8.5 H   (1.5-7.7)  10^3/ul


 


Absolute Lymphs (auto)  0.3 L   (1.0-4.8)  10^3/ul


 


Absolute Monos (auto)  0.7   (0-0.8)  10^3/ul


 


Absolute Eos (auto)  0   (0-0.6)  10^3/ul


 


Absolute Basos (auto)  0.1   (0-0.2)  10^3/ul


 


Absolute Nucleated RBC  0   10^3/ul


 


Nucleated RBC %  0   


 


Sodium   120 L  (133-145)  mmol/L


 


Potassium   4.1  (3.5-5.0)  mmol/L


 


Chloride   91 L  (101-111)  mmol/L


 


Carbon Dioxide   22  (22-32)  mmol/L


 


Anion Gap   7  (2-11)  mmol/L


 


BUN   43 H  (6-24)  mg/dL


 


Creatinine   1.45 H  (0.67-1.17)  mg/dL


 


Est GFR ( Amer)   59.8  (>60)  


 


Est GFR (Non-Af Amer)   46.5  (>60)  


 


BUN/Creatinine Ratio   29.7 H  (8-20)  


 


Glucose   369 H  ()  mg/dL


 


Calcium   8.4 L  (8.6-10.3)  mg/dL


 


Magnesium   1.3 L  (1.9-2.7)  mg/dL


 


Total Bilirubin   0.50  (0.2-1.0)  mg/dL


 


AST   25  (13-39)  U/L


 


ALT   15  (7-52)  U/L


 


Alkaline Phosphatase   57  ()  U/L


 


Total Creatine Kinase   461 H  ()  U/L


 


CK-MB (CK-2)   Pending  


 


Troponin I   Pending  


 


Total Protein   6.8  (6.4-8.9)  g/dL


 


Albumin   3.5  (3.2-5.2)  g/dL


 


Globulin   3.3  (2-4)  g/dL


 


Albumin/Globulin Ratio   1.1  (1-3)  














Assess/Plan/Problems-Billing


Assessment: 











- Patient Problems


(1) Hyponatremia


Current Visit: Yes   Status: Acute   Code(s): E87.1 - HYPO-OSMOLALITY AND 

HYPONATREMIA   SNOMED Code(s): 65352776


   Comment: Likely large component of etiology is his thiazide which has been 

discontinued. [facility] 11/16.   





(2) HTN (hypertension)


Current Visit: Yes   Status: Acute   Code(s): I10 - ESSENTIAL (PRIMARY) 

HYPERTENSION   SNOMED Code(s): 42637237


   Comment: Reduce losartan to 25 mg daily start 11/16 due to low evening BP.  

  





(3) Acute kidney injury


Current Visit: Yes   Status: Acute   Code(s): N17.9 - ACUTE KIDNEY FAILURE, 

UNSPECIFIED   SNOMED Code(s): 24325581


   Comment: [facility] 11/16.   





(4) Diabetes


Current Visit: Yes   Status: Acute   Code(s): E11.9 - TYPE 2 DIABETES MELLITUS 

WITHOUT COMPLICATIONS   SNOMED Code(s): 21768168


   Comment: Stop metformin due to rising creatinine.  Start glipizide 11/15.   





(5) Gait disorder


Current Visit: Yes   Status: Acute   Code(s): R26.9 - UNSPECIFIED ABNORMALITIES 

OF GAIT AND MOBILITY   SNOMED Code(s): 73595212


   Comment: Patient recently started outpt PT.  PT consult requested.

## 2017-11-16 LAB
ANION GAP SERPL CALC-SCNC: 7 MMOL/L (ref 2–11)
BUN SERPL-MCNC: 43 MG/DL (ref 6–24)
BUN/CREAT SERPL: 28.9 (ref 8–20)
CALCIUM SERPL-MCNC: 8 MG/DL (ref 8.6–10.3)
CHLORIDE SERPL-SCNC: 100 MMOL/L (ref 101–111)
GLUCOSE SERPL-MCNC: 86 MG/DL (ref 70–100)
HCO3 SERPL-SCNC: 21 MMOL/L (ref 22–32)
POTASSIUM SERPL-SCNC: 3.7 MMOL/L (ref 3.5–5)
SODIUM SERPL-SCNC: 128 MMOL/L (ref 133–145)

## 2017-11-16 PROCEDURE — 0T9B70Z DRAINAGE OF BLADDER WITH DRAINAGE DEVICE, VIA NATURAL OR ARTIFICIAL OPENING: ICD-10-PCS | Performed by: INTERNAL MEDICINE

## 2017-11-16 RX ADMIN — GLIPIZIDE SCH MG: 5 TABLET ORAL at 10:35

## 2017-11-16 RX ADMIN — HEPARIN SODIUM SCH UNITS: 5000 INJECTION INTRAVENOUS; SUBCUTANEOUS at 05:44

## 2017-11-16 RX ADMIN — LOSARTAN POTASSIUM SCH MG: 25 TABLET, FILM COATED ORAL at 10:00

## 2017-11-16 RX ADMIN — INSULIN GLARGINE SCH UNITS: 100 INJECTION, SOLUTION SUBCUTANEOUS at 09:51

## 2017-11-16 RX ADMIN — INSULIN LISPRO SCH: 100 INJECTION, SOLUTION INTRAVENOUS; SUBCUTANEOUS at 07:59

## 2017-11-16 RX ADMIN — EZETIMIBE SCH MG: 10 TABLET ORAL at 09:50

## 2017-11-16 RX ADMIN — ATENOLOL SCH MG: 25 TABLET ORAL at 09:50

## 2017-11-16 RX ADMIN — INSULIN LISPRO SCH: 100 INJECTION, SOLUTION INTRAVENOUS; SUBCUTANEOUS at 16:51

## 2017-11-16 RX ADMIN — ASPIRIN SCH MG: 81 TABLET, CHEWABLE ORAL at 09:49

## 2017-11-16 RX ADMIN — INSULIN LISPRO SCH: 100 INJECTION, SOLUTION INTRAVENOUS; SUBCUTANEOUS at 22:01

## 2017-11-16 RX ADMIN — GLIPIZIDE SCH: 5 TABLET ORAL at 10:38

## 2017-11-16 RX ADMIN — HEPARIN SODIUM SCH UNITS: 5000 INJECTION INTRAVENOUS; SUBCUTANEOUS at 22:01

## 2017-11-16 RX ADMIN — HEPARIN SODIUM SCH UNITS: 5000 INJECTION INTRAVENOUS; SUBCUTANEOUS at 15:25

## 2017-11-16 RX ADMIN — INSULIN LISPRO SCH: 100 INJECTION, SOLUTION INTRAVENOUS; SUBCUTANEOUS at 11:56

## 2017-11-16 NOTE — PN
Progress Note





- Progress Note


Date of Service: 11/16/17


Note: 





New diagnosis: urinary retention.  PVR by bladder scan 631 ml.

## 2017-11-16 NOTE — PN
Subjective


Date of Service: 11/16/17


Interval History: 





No new c/o.  Feels better since Robins inserted 





Objective


Active Medications: 








Albuterol/Ipratropium (Duoneb (Albuterol 2.5 Mg/Ipratropium 0.5 Mg))  1 neb INH 

Q4H PRN


   PRN Reason: SOB/WHEEZING


Aspirin (Aspirin Low Dose Tab*)  81 mg PO DAILY [facility]


   Last Admin: 11/16/17 09:49 Dose:  81 mg


Atenolol (Tenormin Tab*)  25 mg PO DAILY [facility]


   Last Admin: 11/16/17 09:50 Dose:  25 mg


Dextrose (D50w Syringe 50 Ml*)  12.5 gm IV PUSH .FOR FS < 60 - SS PRN


   PRN Reason: FS < 60


Ezetimibe (Zetia Tab*)  10 mg PO DAILY [facility]


   Last Admin: 11/16/17 09:50 Dose:  10 mg


Glipizide (Glucotrol Tab*)  5 mg PO DAILY WITH MEAL [facility]


   Last Admin: 11/16/17 10:38 Dose:  Not Given


Heparin Sodium (Porcine) (Heparin Vial(*))  5,000 units SUBCUT Q8HR [facility]


   Last Admin: 11/16/17 15:25 Dose:  5,000 units


Insulin Glargine (Lantus(*))  18 units SUBCUT DAILY [facility]


   Last Admin: 11/16/17 09:51 Dose:  18 units


Insulin Human Lispro (Humalog*)  0 units SUBCUT ACHS [facility]


   PRN Reason: Protocol


   Last Admin: 11/16/17 11:56 Dose:  Not Given


Losartan Potassium (Cozaar Tab*)  25 mg PO DAILY [facility]


   Last Admin: 11/16/17 10:00 Dose:  25 mg


Tamsulosin HCl (Flomax Cap*)  0.4 mg PO BEDTIME [facility]








 Vital Signs











  11/15/17 11/15/17 11/16/17





  19:35 19:37 00:03


 


Temperature 100.1 F  99.4 F


 


Pulse Rate 57  113


 


Respiratory 26 18 20





Rate   


 


Blood Pressure 121/32  108/78





(mmHg)   


 


O2 Sat by Pulse 95  96





Oximetry   














  11/16/17 11/16/17 11/16/17





  03:25 08:00 08:35


 


Temperature 99.0 F  98.8 F


 


Pulse Rate 75  72


 


Respiratory 20 16 16





Rate   


 


Blood Pressure 163/47  158/48





(mmHg)   


 


O2 Sat by Pulse 94  93





Oximetry   














  11/16/17





  12:44


 


Temperature 98.9 F


 


Pulse Rate 62


 


Respiratory 28





Rate 


 


Blood Pressure 139/57





(mmHg) 


 


O2 Sat by Pulse 98





Oximetry 











Oxygen Devices in Use Now: None


Appearance: Alert, supine in bed.  In good spirits.  Looks comfortable.


Eyes: No Scleral Icterus


Neck: NL Appearance and Movements; NL JVP, No Thyroid Enlargement, Masses


Respiratory: Symmetrical Chest Expansion and Respiratory Effort, Clear to 

Auscultation, Clear to Percussion


Extremities: No Edema, No Clubbing, Cyanosis, -


Skin: No Rash or Ulcers, No Nodules or Sclerosis, -


Neurological: Alert and Oriented x 3, NL Sensation


Result Diagrams: 


 11/15/17 07:11





 11/16/17 04:56


Additional Lab and Data: 


 Lab Results











  11/14/17 11/14/17 Range/Units





  20:22 20:22 


 


WBC  9.6   (3.5-10.8)  10^3/ul


 


RBC  4.21   (4.0-5.4)  10^6/ul


 


Hgb  12.4 L   (14.0-18.0)  g/dl


 


Hct  36 L   (42-52)  %


 


MCV  86   (80-94)  fL


 


MCH  29   (27-31)  pg


 


MCHC  34   (31-36)  g/dl


 


RDW  14   (10.5-15)  %


 


Plt Count  246   (150-450)  10^3/ul


 


MPV  7 L   (7.4-10.4)  um3


 


Neut % (Auto)  88.4 H   (38-83)  %


 


Lymph % (Auto)  3.0 L   (25-47)  %


 


Mono % (Auto)  7.7   (1-9)  %


 


Eos % (Auto)  0   (0-6)  %


 


Baso % (Auto)  0.9   (0-2)  %


 


Absolute Neuts (auto)  8.5 H   (1.5-7.7)  10^3/ul


 


Absolute Lymphs (auto)  0.3 L   (1.0-4.8)  10^3/ul


 


Absolute Monos (auto)  0.7   (0-0.8)  10^3/ul


 


Absolute Eos (auto)  0   (0-0.6)  10^3/ul


 


Absolute Basos (auto)  0.1   (0-0.2)  10^3/ul


 


Absolute Nucleated RBC  0   10^3/ul


 


Nucleated RBC %  0   


 


Sodium   120 L  (133-145)  mmol/L


 


Potassium   4.1  (3.5-5.0)  mmol/L


 


Chloride   91 L  (101-111)  mmol/L


 


Carbon Dioxide   22  (22-32)  mmol/L


 


Anion Gap   7  (2-11)  mmol/L


 


BUN   43 H  (6-24)  mg/dL


 


Creatinine   1.45 H  (0.67-1.17)  mg/dL


 


Est GFR ( Amer)   59.8  (>60)  


 


Est GFR (Non-Af Amer)   46.5  (>60)  


 


BUN/Creatinine Ratio   29.7 H  (8-20)  


 


Glucose   369 H  ()  mg/dL


 


Calcium   8.4 L  (8.6-10.3)  mg/dL


 


Magnesium   1.3 L  (1.9-2.7)  mg/dL


 


Total Bilirubin   0.50  (0.2-1.0)  mg/dL


 


AST   25  (13-39)  U/L


 


ALT   15  (7-52)  U/L


 


Alkaline Phosphatase   57  ()  U/L


 


Total Creatine Kinase   461 H  ()  U/L


 


CK-MB (CK-2)   Pending  


 


Troponin I   Pending  


 


Total Protein   6.8  (6.4-8.9)  g/dL


 


Albumin   3.5  (3.2-5.2)  g/dL


 


Globulin   3.3  (2-4)  g/dL


 


Albumin/Globulin Ratio   1.1  (1-3)  














Assess/Plan/Problems-Billing


Assessment: 











- Patient Problems


(1) Hyponatremia


Current Visit: Yes   Status: Acute   Code(s): E87.1 - HYPO-OSMOLALITY AND 

HYPONATREMIA   SNOMED Code(s): 17957543


   Comment: Likely large component of etiology is his thiazide which has been 

discontinued. BMP 11/16.   





(2) HTN (hypertension)


Current Visit: Yes   Status: Acute   Code(s): I10 - ESSENTIAL (PRIMARY) 

HYPERTENSION   SNOMED Code(s): 36713083


   Comment: Losartan reduced to 25 mg daily starting 11/16 due to low evening 

BP.    





(3) Acute kidney injury


Current Visit: Yes   Status: Acute   Code(s): N17.9 - ACUTE KIDNEY FAILURE, 

UNSPECIFIED   SNOMED Code(s): 21184028


   Comment: Likely in part related to urinary retention, should improved with 

Robins catheter drainage.  Plan repeat BMP next week.    





(4) Diabetes


Current Visit: Yes   Status: Acute   Code(s): E11.9 - TYPE 2 DIABETES MELLITUS 

WITHOUT COMPLICATIONS   SNOMED Code(s): 88724071


   Comment: Stop metformin due to rising creatinine.  Started glipizide 11/15, 

reduced to once daily 11/16.  Glargine insulin reduced to 18 U starting 09:51 11 /16.   





(5) Gait disorder


Current Visit: Yes   Status: Acute   Code(s): R26.9 - UNSPECIFIED ABNORMALITIES 

OF GAIT AND MOBILITY   SNOMED Code(s): 15735112


   Comment: Patient recently started outpt PT.  PT consult requested.    





(6) Urinary retention


Current Visit: Yes   Status: Acute   Code(s): R33.9 - RETENTION OF URINE, 

UNSPECIFIED   SNOMED Code(s): 222793446


   Comment:  ml by bladder scan.  Had urinary retention treated by [facility] 4 /2011.  Tamsulosin started hs 11/16, consider voiding trial in about a week.

## 2017-11-17 VITALS — SYSTOLIC BLOOD PRESSURE: 138 MMHG | DIASTOLIC BLOOD PRESSURE: 30 MMHG

## 2017-11-17 LAB
ANION GAP SERPL CALC-SCNC: 4 MMOL/L (ref 2–11)
BUN SERPL-MCNC: 35 MG/DL (ref 6–24)
BUN/CREAT SERPL: 27.1 (ref 8–20)
CALCIUM SERPL-MCNC: 8.3 MG/DL (ref 8.6–10.3)
CHLORIDE SERPL-SCNC: 99 MMOL/L (ref 101–111)
GLUCOSE SERPL-MCNC: 180 MG/DL (ref 70–100)
HCO3 SERPL-SCNC: 24 MMOL/L (ref 22–32)
POTASSIUM SERPL-SCNC: 4 MMOL/L (ref 3.5–5)
SODIUM SERPL-SCNC: 127 MMOL/L (ref 133–145)

## 2017-11-17 RX ADMIN — INSULIN LISPRO SCH UNIT: 100 INJECTION, SOLUTION INTRAVENOUS; SUBCUTANEOUS at 12:40

## 2017-11-17 RX ADMIN — ATENOLOL SCH: 25 TABLET ORAL at 09:10

## 2017-11-17 RX ADMIN — INSULIN LISPRO SCH UNIT: 100 INJECTION, SOLUTION INTRAVENOUS; SUBCUTANEOUS at 09:09

## 2017-11-17 RX ADMIN — INSULIN GLARGINE SCH UNITS: 100 INJECTION, SOLUTION SUBCUTANEOUS at 09:10

## 2017-11-17 RX ADMIN — LOSARTAN POTASSIUM SCH MG: 25 TABLET, FILM COATED ORAL at 09:09

## 2017-11-17 RX ADMIN — EZETIMIBE SCH MG: 10 TABLET ORAL at 09:09

## 2017-11-17 RX ADMIN — ASPIRIN SCH MG: 81 TABLET, CHEWABLE ORAL at 09:09

## 2017-11-17 RX ADMIN — GLIPIZIDE SCH MG: 5 TABLET ORAL at 09:09

## 2017-11-17 RX ADMIN — HEPARIN SODIUM SCH UNITS: 5000 INJECTION INTRAVENOUS; SUBCUTANEOUS at 06:05

## 2017-11-17 NOTE — PN
Progress Note





- Progress Note


Date of Service: 11/17/17


Note: 





Time spent on discharge 50 minutes.

## 2017-11-17 NOTE — TRS
CC:  Molina Acuña NP

 

TRANSFER SUMMARY:

 

DATE OF TRANSFER:  11/17/17.

 

HISTORY:  This 83-year-old man presented with elevated blood sugar, cramps in 
his calf, thirst.  There was some considerable concern about the family that he 
was sleeping poorly.  He was not taking his medications correctly.  He did have 
an episode where apparently he uses insulin pen twice in one day and had 
hyperglycemic episode.  The family have since taken the pen away from and 
administered the insulin themselves.  His son is under stress as his wife is 
ill. He was providing a lot of hands-on care for his father also and he felt 
that he could no longer do that.

 

The patient was able to have his blood sugar regulated fairly well in the 
hospital. I switched him from metformin to glipizide because of his renal 
dysfunction.  He seems to be rather sensitive to glipizide, I cut down his 
Lantus dose.  There may be some further adjustments that will help us to see 
how this activity is in the nursing home.  I note his appetite is good.  His 
blood pressure medications were also changed.  He was a little bradycardic on 
the day of transfer and his beta- blocker was stopped.

 

He did have urinary retention, Robins catheter was inserted on 11/16/17.  I 
think his renal function will improve after this.  I would recommend getting a 
BMP on 11/20/17 or sometime that week, I suspect it will be better.  His 
creatinine was less measured here at 1.29 on 11/17/17, probably near his 
baseline.  I started him on tamsulosin, the voiding trial could be considered 
in about a week.

 

FINAL DIAGNOSES:

1.  Hyponatremia related to thiazide.

2.  Hypertension.

3.  Acute kidney injury in part related to urinary retention.

4.  Diabetes.

5.  Gait disorder.

6.  Urinary retention.

 

MEDICATIONS ON TRANSFER:

1.  Albuterol ipratropium 1 nebulized treatment every 4 hours p.r.n.

2.  Glipizide 5 mg daily. 

3.  Glargine insulin 18 units daily at 9 a.m.

4.  Losartan 25 mg daily.

5.  Tamsulosin 0.4 mg at bedtime.

6.  Aspirin 81 mg daily.

7.  Ezetimibe 10 mg daily.

 

 [id number]/CPS #: [id number]

[facility]

## 2019-06-02 ENCOUNTER — HOSPITAL ENCOUNTER (INPATIENT)
Dept: HOSPITAL 25 - ED | Age: 84
LOS: 4 days | Discharge: NURSING, REHAB OR LONG-TERM CARE | DRG: 243 | End: 2019-06-06
Attending: INTERNAL MEDICINE | Admitting: INTERNAL MEDICINE
Payer: MEDICARE

## 2019-06-02 DIAGNOSIS — E11.40: ICD-10-CM

## 2019-06-02 DIAGNOSIS — I44.1: ICD-10-CM

## 2019-06-02 DIAGNOSIS — I50.9: ICD-10-CM

## 2019-06-02 DIAGNOSIS — I48.0: ICD-10-CM

## 2019-06-02 DIAGNOSIS — Z97.4: ICD-10-CM

## 2019-06-02 DIAGNOSIS — Z88.8: ICD-10-CM

## 2019-06-02 DIAGNOSIS — Z83.3: ICD-10-CM

## 2019-06-02 DIAGNOSIS — E11.36: ICD-10-CM

## 2019-06-02 DIAGNOSIS — Z79.4: ICD-10-CM

## 2019-06-02 DIAGNOSIS — Z82.5: ICD-10-CM

## 2019-06-02 DIAGNOSIS — Z91.041: ICD-10-CM

## 2019-06-02 DIAGNOSIS — G31.9: ICD-10-CM

## 2019-06-02 DIAGNOSIS — F03.90: ICD-10-CM

## 2019-06-02 DIAGNOSIS — R55: ICD-10-CM

## 2019-06-02 DIAGNOSIS — Z88.1: ICD-10-CM

## 2019-06-02 DIAGNOSIS — H91.90: ICD-10-CM

## 2019-06-02 DIAGNOSIS — I11.0: ICD-10-CM

## 2019-06-02 DIAGNOSIS — D64.9: ICD-10-CM

## 2019-06-02 DIAGNOSIS — I49.5: Primary | ICD-10-CM

## 2019-06-02 DIAGNOSIS — E78.5: ICD-10-CM

## 2019-06-02 DIAGNOSIS — I47.2: ICD-10-CM

## 2019-06-02 DIAGNOSIS — N40.0: ICD-10-CM

## 2019-06-02 DIAGNOSIS — Z82.49: ICD-10-CM

## 2019-06-02 DIAGNOSIS — Z98.41: ICD-10-CM

## 2019-06-02 DIAGNOSIS — Z79.82: ICD-10-CM

## 2019-06-02 LAB
ALBUMIN SERPL BCG-MCNC: 3.2 G/DL (ref 3.2–5.2)
ALBUMIN/GLOB SERPL: 1.3 {RATIO} (ref 1–3)
ALP SERPL-CCNC: 49 U/L (ref 34–104)
ALT SERPL W P-5'-P-CCNC: 7 U/L (ref 7–52)
ANION GAP SERPL CALC-SCNC: 6 MMOL/L (ref 2–11)
ANION GAP SERPL CALC-SCNC: 9 MMOL/L (ref 2–11)
AST SERPL-CCNC: 11 U/L (ref 13–39)
BASOPHILS # BLD AUTO: 0 10^3/UL (ref 0–0.2)
BASOPHILS # BLD AUTO: 0 10^3/UL (ref 0–0.2)
BUN SERPL-MCNC: 24 MG/DL (ref 6–24)
BUN SERPL-MCNC: 33 MG/DL (ref 6–24)
BUN/CREAT SERPL: 25 (ref 8–20)
BUN/CREAT SERPL: 30.6 (ref 8–20)
CALCIUM SERPL-MCNC: 8.3 MG/DL (ref 8.6–10.3)
CALCIUM SERPL-MCNC: 8.6 MG/DL (ref 8.6–10.3)
CHLORIDE SERPL-SCNC: 103 MMOL/L (ref 101–111)
CHLORIDE SERPL-SCNC: 104 MMOL/L (ref 101–111)
EOSINOPHIL # BLD AUTO: 0 10^3/UL (ref 0–0.6)
EOSINOPHIL # BLD AUTO: 0 10^3/UL (ref 0–0.6)
GLOBULIN SER CALC-MCNC: 2.4 G/DL (ref 2–4)
GLUCOSE SERPL-MCNC: 196 MG/DL (ref 70–100)
GLUCOSE SERPL-MCNC: 219 MG/DL (ref 70–100)
HCO3 SERPL-SCNC: 23 MMOL/L (ref 22–32)
HCO3 SERPL-SCNC: 27 MMOL/L (ref 22–32)
HCT VFR BLD AUTO: 29 % (ref 42–52)
HCT VFR BLD AUTO: 35 % (ref 42–52)
HGB BLD-MCNC: 11.7 G/DL (ref 14–18)
HGB BLD-MCNC: 9.8 G/DL (ref 14–18)
IRON SERPL-MCNC: 28 UG/DL (ref 50–212)
LYMPHOCYTES # BLD AUTO: 0.6 10^3/UL (ref 1–4.8)
LYMPHOCYTES # BLD AUTO: 1 10^3/UL (ref 1–4.8)
MAGNESIUM SERPL-MCNC: 1.8 MG/DL (ref 1.9–2.7)
MAGNESIUM SERPL-MCNC: 2.1 MG/DL (ref 1.9–2.7)
MCH RBC QN AUTO: 30 PG (ref 27–31)
MCH RBC QN AUTO: 30 PG (ref 27–31)
MCHC RBC AUTO-ENTMCNC: 33 G/DL (ref 31–36)
MCHC RBC AUTO-ENTMCNC: 33 G/DL (ref 31–36)
MCV RBC AUTO: 89 FL (ref 80–94)
MCV RBC AUTO: 90 FL (ref 80–94)
MONOCYTES # BLD AUTO: 0.8 10^3/UL (ref 0–0.8)
MONOCYTES # BLD AUTO: 0.9 10^3/UL (ref 0–0.8)
NEUTROPHILS # BLD AUTO: 12.8 10^3/UL (ref 1.5–7.7)
NEUTROPHILS # BLD AUTO: 6.5 10^3/UL (ref 1.5–7.7)
NRBC # BLD AUTO: 0 10^3/UL
NRBC # BLD AUTO: 0 10^3/UL
NRBC BLD QL AUTO: 0
NRBC BLD QL AUTO: 0
PLATELET # BLD AUTO: 234 10^3/UL (ref 150–450)
PLATELET # BLD AUTO: 297 10^3/UL (ref 150–450)
POTASSIUM SERPL-SCNC: 3.6 MMOL/L (ref 3.5–5)
POTASSIUM SERPL-SCNC: 4 MMOL/L (ref 3.5–5)
PROT SERPL-MCNC: 5.6 G/DL (ref 6.4–8.9)
RBC # BLD AUTO: 3.25 10^6 /UL (ref 4.18–5.48)
RBC # BLD AUTO: 3.95 10^6 /UL (ref 4.18–5.48)
SODIUM SERPL-SCNC: 136 MMOL/L (ref 135–145)
SODIUM SERPL-SCNC: 136 MMOL/L (ref 135–145)
TIBC SERPL-MCNC: 270 MCG/DL (ref 250–450)
TRANSFERRIN SERPL-MCNC: 193 MG/DL (ref 203–362)
TROPONIN I SERPL-MCNC: 0 NG/ML (ref ?–0.04)
TROPONIN I SERPL-MCNC: 0 NG/ML (ref ?–0.04)
TSH SERPL-ACNC: 4.21 MCIU/ML (ref 0.34–5.6)
WBC # BLD AUTO: 14.2 10^3/UL (ref 3.5–10.8)
WBC # BLD AUTO: 8.6 10^3/UL (ref 3.5–10.8)

## 2019-06-02 PROCEDURE — 87641 MR-STAPH DNA AMP PROBE: CPT

## 2019-06-02 PROCEDURE — 33208 INSRT HEART PM ATRIAL & VENT: CPT

## 2019-06-02 PROCEDURE — 36415 COLL VENOUS BLD VENIPUNCTURE: CPT

## 2019-06-02 PROCEDURE — 99157 MOD SED OTHER PHYS/QHP EA: CPT

## 2019-06-02 PROCEDURE — C1898 LEAD, PMKR, OTHER THAN TRANS: HCPCS

## 2019-06-02 PROCEDURE — 84484 ASSAY OF TROPONIN QUANT: CPT

## 2019-06-02 PROCEDURE — 71046 X-RAY EXAM CHEST 2 VIEWS: CPT

## 2019-06-02 PROCEDURE — 70450 CT HEAD/BRAIN W/O DYE: CPT

## 2019-06-02 PROCEDURE — 85025 COMPLETE CBC W/AUTO DIFF WBC: CPT

## 2019-06-02 PROCEDURE — 93306 TTE W/DOPPLER COMPLETE: CPT

## 2019-06-02 PROCEDURE — 93005 ELECTROCARDIOGRAM TRACING: CPT

## 2019-06-02 PROCEDURE — 71045 X-RAY EXAM CHEST 1 VIEW: CPT

## 2019-06-02 PROCEDURE — 83735 ASSAY OF MAGNESIUM: CPT

## 2019-06-02 PROCEDURE — C1785 PMKR, DUAL, RATE-RESP: HCPCS

## 2019-06-02 PROCEDURE — 80053 COMPREHEN METABOLIC PANEL: CPT

## 2019-06-02 PROCEDURE — 80048 BASIC METABOLIC PNL TOTAL CA: CPT

## 2019-06-02 PROCEDURE — 83605 ASSAY OF LACTIC ACID: CPT

## 2019-06-02 PROCEDURE — 83540 ASSAY OF IRON: CPT

## 2019-06-02 PROCEDURE — 81003 URINALYSIS AUTO W/O SCOPE: CPT

## 2019-06-02 PROCEDURE — C8929 TTE W OR WO FOL WCON,DOPPLER: HCPCS

## 2019-06-02 PROCEDURE — C1892 INTRO/SHEATH,FIXED,PEEL-AWAY: HCPCS

## 2019-06-02 PROCEDURE — 83550 IRON BINDING TEST: CPT

## 2019-06-02 PROCEDURE — 76706 US ABDL AORTA SCREEN AAA: CPT

## 2019-06-02 PROCEDURE — 84443 ASSAY THYROID STIM HORMONE: CPT

## 2019-06-02 PROCEDURE — 99285 EMERGENCY DEPT VISIT HI MDM: CPT

## 2019-06-02 PROCEDURE — 99156 MOD SED OTH PHYS/QHP 5/>YRS: CPT

## 2019-06-02 RX ADMIN — DOCUSATE SODIUM SCH MG: 100 CAPSULE, LIQUID FILLED ORAL at 21:13

## 2019-06-02 RX ADMIN — DOCUSATE SODIUM SCH MG: 100 CAPSULE, LIQUID FILLED ORAL at 08:16

## 2019-06-02 RX ADMIN — INSULIN LISPRO SCH UNITS: 100 INJECTION, SOLUTION INTRAVENOUS; SUBCUTANEOUS at 18:13

## 2019-06-02 RX ADMIN — ASPIRIN SCH MG: 81 TABLET, CHEWABLE ORAL at 08:16

## 2019-06-02 RX ADMIN — INSULIN LISPRO SCH UNITS: 100 INJECTION, SOLUTION INTRAVENOUS; SUBCUTANEOUS at 21:13

## 2019-06-02 RX ADMIN — INSULIN LISPRO SCH: 100 INJECTION, SOLUTION INTRAVENOUS; SUBCUTANEOUS at 12:56

## 2019-06-02 RX ADMIN — NYSTATIN SCH APPLIC: 100000 POWDER TOPICAL at 20:52

## 2019-06-02 RX ADMIN — INSULIN LISPRO SCH UNITS: 100 INJECTION, SOLUTION INTRAVENOUS; SUBCUTANEOUS at 08:57

## 2019-06-02 RX ADMIN — SODIUM CHLORIDE SCH MLS/HR: 900 IRRIGANT IRRIGATION at 20:33

## 2019-06-02 RX ADMIN — SODIUM CHLORIDE SCH MLS/HR: 900 IRRIGANT IRRIGATION at 06:48

## 2019-06-02 RX ADMIN — TAMSULOSIN HYDROCHLORIDE SCH MG: 0.4 CAPSULE ORAL at 21:13

## 2019-06-02 RX ADMIN — NYSTATIN SCH APPLIC: 100000 POWDER TOPICAL at 18:13

## 2019-06-02 RX ADMIN — INSULIN LISPRO SCH: 100 INJECTION, SOLUTION INTRAVENOUS; SUBCUTANEOUS at 13:27

## 2019-06-02 NOTE — PN
Hospitalist Progress Note


Date of Service: 06/02/19





Pt examined at bedside.Was admitted a few hours ago by Dr Gordon. HR improved to 

70s in 


sinus.Metoprolol held. Also had low blood glucose 30s and EMS gave him glucose 

and glucagon which should help with b blocker reversal.


Seen by cardiology.BP in 170s.Will restart his home losartan and prn 

hydralazine.

## 2019-06-02 NOTE — HP
CC:  Physician from [facility] *

 

HISTORY AND PHYSICAL:

 

DATE OF ADMISSION:  06/02/19

 

PRIMARY CARE PROVIDER:  Physician from [facility].

 

CHIEF COMPLAINT:  The patient came in after a syncopal episode noted by a 
registered nurse.  The patient himself does not remember.

 

HISTORY OF PRESENT ILLNESS:  Donovan Pelaez is an 85-year-old male with 
history of dementia, diabetes, hypertension, on anticoagulation for likely 
history of atrial fibrillation, who presented to the hospital after a syncopal 
episode at [facility].  The patient apparently was noted to have low 
heart rate at this point in the 30s.  The patient himself does not remember 
that due to his dementia. He was seen in the emergency department room with his 
son, Billy, who is his healthcare proxy and Billy's wife.  The patient had been 
on Xarelto for history of cardiac arrhythmia likely atrial fibrillation.  Here 
in the emergency department, his heart rate is still in the 30s when he is 
asleep, but when he wakes up, goes up to the 60s.  He is going to be initially 
admitted to the intensive care unit with diagnosis of bradycardia and syncope.

 

PAST MEDICAL HISTORY: Mostly obtained from the patient's medical records and 
from discussions with the patient's family since the patient does not remember 
and include:

1.  The patient is very hard of hearing.

2.  Diabetes.

3.  Hypertension.

4.  History of cardiac arrhythmia likely atrial fibrillation on Xarelto.

5.  Diabetes with diabetic neuropathy.

6.  BPH.

7.  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

8.  Hyperlipidemia.

9.  History of tonsillectomy.

10.  History of cyst removal on the back.

11.  History of TURP.

12.  Umbilical hernia repair.

13.  History of hyponatremia due to hydrochlorothiazide.

 

CURRENT MEDICATIONS: Includes:

1.  Flomax 0.4 mg at bedtime.

2.  Losartan 25 mg daily.

3.  Aspirin 81 mg daily.

4.  Glargine 24 units daily.

5.  Xarelto 20 mg daily.

6.  Senna 2 tabs b.i.d.

7.  Metoprolol tartrate 25 mg b.i.d.

8.  Furosemide 60 mg daily.

9.  Insulin glargine 20 units at bedtime.

10.  Insulin lispro 4 units b.i.d.

 

ALLERGIES:  Include BACITRACIN, IODINATED CONTRAST, NEOMYCIN.

 

FAMILY HISTORY:  Positive for father with emphysema, mom with diabetes.

 

SOCIAL HISTORY:  The patient denies any tobacco, alcohol, or drug use.  He 
lives right now at [facility].  As per family, he ambulates 
independently.  He is very hard of hearing and has poor memory due to dementia.
  His surrogate and healthcare proxy is his son, Billy Pelaez.

 

REVIEW OF SYSTEMS:  The patient denies any pain.  He does not remember why he 
got here and he does not remember having syncopal episode.  All remaining 12 
systems reviewed with the patient and were very limited due to his poor memory.

 

                               PHYSICAL EXAMINATION

 

GENERAL:  The patient is pleasant 85-year-old male who is aware that he is in 
the hospital, remembered his date of birth and name, but he cannot give me his 
age or the current day.

 

VITAL SIGNS:  Blood pressure of 124/44, heart rate of 77 and regular, 
respiratory rate 22, oxygen saturation 90% on room air, temperature of 96.1.

 

HEENT:  Head:  Atraumatic, normocephalic.  Eyes:  Pupils are equal, reactive to 
light and accommodation.  Oropharynx clear.  Mucosa moist.

 

NECK:  Supple.  No JVD.  No bruits bilaterally.

 

RESPIRATORY:  Clear to auscultation bilaterally.

 

CARDIOVASCULAR:  Regular rat and rhythm.  No murmur.

 

ABDOMEN:  Soft, nontender.  Bowel sounds present in all 4 quadrants.

 

EXTREMITIES:  There is +1 pitting pedal edema bilaterally.  Pulses are +2 
bilaterally.  No clubbing or cyanosis.

 

NEURO EVALUATION:  Speech clear.  The patient is very hard of hearing.  Cranial 
nerves II through XII grossly intact.  Motor strength is 5/5 bilaterally.

 

SKIN:  On evaluation of the skin, the patient has sustained a small skin tear 
on the dorsum of his right hand during the syncopal episode.

 

DIAGNOSTIC STUDIES/LAB DATA:  White blood cell count 14.2, hemoglobin 9.8, 
hematocrit 29, and platelets 234.

Sodium 139, potassium 3.6, chloride 104, carbon dioxide 23, BUN 33, creatinine 
1.08.  Liver function tests were unremarkable.  Lactic acid 1.1, magnesium of 
1.8, troponin is 0, TSH is 4.2.

 

The patient's portable chest x-ray showed poor inspiration and no acute 
abnormality. An official report read by myself and official radiologist's 
report is still pending.

 

CT of the patient's brain is pending at the time of dictation.

 

The patient's EKG showed likely sinus rhythm with sinus arrhythmia and 
bradycardia with a heart rate of 49.  Currently on telemetry with blood 
pressure is in normal sinus rhythm with a heart rate in the 60s.

 

ASSESSMENT AND PLAN:

1.  The patient had an episode of bradycardia and syncope that was witnessed by 
the nursing staff at [facility].  The patient is going to be 
observed in the intensive care unit for the time being to rule his bradycardia.
  His metoprolol is going to be held.  We will ask Dr. Klein from Cardiology 
Division in consultation. I will obtain Echo for further evaluation.

2.  In regards to the patient's diabetic management, the patient is going to be 
placed on insulin sliding scale and due to being on a clear liquid diet, I will 
not start his Lantus until his diabetes is advanced.

3.  In regards to the patient's hypertension, his metoprolol is going to be 
held. I will continue the patient's furosemide.  Losartan will be held.  The 
patient so far had been hemodynamically stable during his evaluation.

4.  The patient with a history of paroxysmal atrial fibrillation.  Currently, 
he is in a sinus bradycardia.  His Xarelto is going to be held in case the 
patient needed a pacemaker.

5.  The patient's code status is full.  It was discussed with his son, Billy.

 

TIME SPENT:  Approximately 65 minutes were spent in the admission of this 
patient, more than half the time was spent face-to-face with the patient during 
the interview and physical exam.

 

 

 

[id number]/CPS #: [id number]

LOTTIE

## 2019-06-02 NOTE — ED
Syncope/Near Syncope





- HPI Summary


HPI Summary: 


This patient is a 85 year old M brought to ED via EMS with a chief complaint of 

syncope at the nursing home at 0100 this morning. Patient stood up and had a 

syncopal episode in front of the nurse who gave him glucagon. He had a HR of 30 

at the nursing home and HR of 40 in the ambulance. The patient rates the pain 0/

10 in severity. Symptoms aggravated by nothing. Symptoms alleviated by nothing. 

Patient reports a skin tear on the right hand. Patient denies myalgia.





- History Of Current Complaint


Chief Complaint: EDSyncope


Time Seen by Provider: 06/02/19 01:42


Hx Obtained From: Patient, EMS


Onset/Duration: Sudden Onset


Context: Witnessed, Loss Of Consciousness


Activity At Onset: Other - Standing up


Aggravating Factor(s): Nothing


Alleviating Factor(s): Nothing


Associated Signs And Symptoms: Negative - Myalgia





- Allergies/Home Medications


Allergies/Adverse Reactions: 


 Allergies











Allergy/AdvReac Type Severity Reaction Status Date / Time


 


bacitracin Allergy  Hives Verified 06/02/19 05:18





[From Neosporin     





([id number])]     


 


Iodinated Contrast- Oral and Allergy  Hives Verified 06/02/19 05:18





IV Dye     


 


neomycin Allergy  Hives Verified 06/02/19 05:18





[From Neosporin     





([id number])]     


 


polymyxin B Allergy  Hives Verified 06/02/19 05:18





[From Neosporin     





([id number])]     


 


hydrochlorothiazide AdvReac  See Comment Verified 06/02/19 05:18





[From Maxzide]     


 


triamterene [From Maxzide] AdvReac  See Comment Verified 06/02/19 05:18











Home Medications: 


 Home Medications





Furosemide 60 mg PO DAILY 06/02/19 [History Confirmed 06/02/19]


Insulin GLARGINE(*) [Lantus(*)] 20 units SUBCUT BEDTIME 06/02/19 [History 

Confirmed 06/02/19]


Insulin GLARGINE(*) [Lantus(*)] 24 units SUBCUT DAILY 06/02/19 [History 

Confirmed 06/02/19]


Insulin Lispro [Insulin Lispro Kwikpen U-100] 4 unit SQ BID 06/02/19 [History 

Confirmed 06/02/19]


Metoprolol Tartrate 25 mg PO BID 06/02/19 [History Confirmed 06/02/19]


Rivaroxaban TAB(*) [Xarelto 20 mg] 20 mg PO DAILY 06/02/19 [History Confirmed 06 /02/19]


Sennosides/Docusate Sodium [Senna Plus Tablet] 2 each PO BID 06/02/19 [History 

Confirmed 06/02/19]











PMH/Surg Hx/FS Hx/Imm Hx


Endocrine/Hematology History: Reports: Hx Diabetes - ON MEDS


Cardiovascular History: Reports: Hx Hypertension - ON MEDS


   Denies: Other Cardiovascular Problems/Disorders


Respiratory History: 


   Denies: Other Respiratory Problems/Disorders


GI History: 


   Denies: Other GI Disorders


Musculoskeletal History: 


   Denies: Other Musculoskeletal History


Sensory History: Reports: Hx Cataracts, Hx Hearing Aid


   Denies: Hx Contacts or Glasses


Opthamlomology History: Reports: Hx Cataracts


   Denies: Hx Contacts or Glasses


Neurological History: 


   Denies: Other Neuro Impairments/Disorders





- Surgical History


Surgery Procedure, Year, and Place: PROSTATE 2011, [facility] [place].  2003, LASER SURGERY 

[place], [place].  2002, BENIGN TUMOR FROM BACK, [place].  2013, RIGHT CATARACT

, [facility]


Hx Anesthesia Reactions: No


Infectious Disease History: 


   Denies: Traveled Outside the [place] in Last 30 Days





- Family History


Known Family History: Positive: Hypertension, Diabetes





- Social History


Alcohol Use: None


Substance Use Type: Reports: None


Smoking Status (MU): Never Smoked Tobacco





Review of Systems


Negative: Myalgia


Skin: Other - Skin tear on right hand


All Other Systems Reviewed And Are Negative: Yes





Physical Exam





- Summary


Physical Exam Summary: 


Appearance:Well-appearing, Well-nourished, lying in bed comfortable


Skin:skin tear on right volar hand on first web space


Eyes:sclera anicteric, no conjunctival pallor


ENT:mucous membranes moist


Neck:deferred


Respiratory:No signs of respiratory distress


Cardiovascular:Appears well perfused, pulses are nml


Abdomen:deferred


Musculoskeletal:Moving all 4 extremities without obvious discomfort


Neurological:Awakeand alert, mentation is normal, speech is fluent and 

appropriate


Psychiatric:affect is normal, does not appear anxious or depressed


Triage Information Reviewed: Yes


Vital Signs On Initial Exam: 





 Initial Vitals











Temp Pulse Resp BP Pulse Ox


 


 96.1 F   45   15   101/39   99 


 


 06/02/19 01:46  06/02/19 01:46  06/02/19 01:46  06/02/19 01:46  06/02/19 01:46











Vital Signs Reviewed: Yes





Diagnostics





- Laboratory


Result Diagrams: 


 06/02/19 16:30





 06/02/19 16:30


Lab Statement: Any lab studies that have been ordered have been reviewed, and 

results considered in the medical decision making process.





- Radiology


  ** CXR


Radiology Interpretation Completed By: ED Physician


Summary of Radiographic Findings: No acute processes. Pending official 

radiology report.





- EKG


  ** 0227


Cardiac Rate: Bradycardia - 49 BPM


EKG Rhythm: Sinus Bradycardia


Summary of EKG Findings: Sinus bradycardia at 49 BPM. 1 degree AVB.





Course/Dx


Course Of Treatment: This patient is a 85 year old M brought to ED via EMS with 

a chief complaint of syncope at the nursing home at 0100 this morning. In the ED

, patient received fluids. CXR revealed no acute processes, pending official 

radiology report. EKG revealed: Sinus bradycardia at 49 BPM. 1 degree AVB. 

Discussed patient case with Dr. Gordon, hospitalist, who accepted the patient for 

admission. Patient will be admitted with dx of syncope and bradycardia. Patient 

understands and agrees with this plan.





- Diagnoses


Provider Diagnoses: 


 Syncope, Bradycardia








- Physician Notifications


Discussed Care of Patient With: Angélica Gordon


Time Discussed With Above Provider: 04:05


Instructed by Provider To: Admit As Inpatient - Discussed patient case with Dr. Gordon, hospitalist, who accepted the patient for admission.





Discharge





- Sign-Out/Discharge


Documenting (check all that apply): Patient Departure - Admit


Patient Received Moderate/Deep Sedation with Procedure: No





- Discharge Plan


Condition: Good


Disposition: ADMITTED TO [facility]





- Billing Disposition and Condition


Condition: GOOD


Disposition: Admitted to [facility]





- Attestation Statements


Document Initiated by Joeibe: Yes


Documenting Scribe: Mario Somers


Provider For Whom Naa is Documenting (Include Credential): Parveen Garsia MD


Scribjose Attestation: 


Mario PATRICK, scribed for Parveen Garsia MD on 06/03/19 at 0230. 


Scribe Documentation Reviewed: Yes


Provider Attestation: 


The documentation as recorded by the aMrio holt accurately reflects 

the service I personally performed and the decisions made by me, Parveen Garsia MD


Status of Scribe Document: Viewed

## 2019-06-03 LAB
ANION GAP SERPL CALC-SCNC: 7 MMOL/L (ref 2–11)
BASOPHILS # BLD AUTO: 0 10^3/UL (ref 0–0.2)
BUN SERPL-MCNC: 18 MG/DL (ref 6–24)
BUN/CREAT SERPL: 20.9 (ref 8–20)
CALCIUM SERPL-MCNC: 8.2 MG/DL (ref 8.6–10.3)
CHLORIDE SERPL-SCNC: 106 MMOL/L (ref 101–111)
EOSINOPHIL # BLD AUTO: 0.2 10^3/UL (ref 0–0.6)
GLUCOSE SERPL-MCNC: 146 MG/DL (ref 70–100)
HCO3 SERPL-SCNC: 24 MMOL/L (ref 22–32)
HCT VFR BLD AUTO: 31 % (ref 42–52)
HGB BLD-MCNC: 10.5 G/DL (ref 14–18)
LYMPHOCYTES # BLD AUTO: 1.1 10^3/UL (ref 1–4.8)
MCH RBC QN AUTO: 30 PG (ref 27–31)
MCHC RBC AUTO-ENTMCNC: 33 G/DL (ref 31–36)
MCV RBC AUTO: 89 FL (ref 80–94)
MONOCYTES # BLD AUTO: 1 10^3/UL (ref 0–0.8)
NEUTROPHILS # BLD AUTO: 6.6 10^3/UL (ref 1.5–7.7)
NRBC # BLD AUTO: 0 10^3/UL
NRBC BLD QL AUTO: 0
PLATELET # BLD AUTO: 266 10^3/UL (ref 150–450)
POTASSIUM SERPL-SCNC: 4 MMOL/L (ref 3.5–5)
RBC # BLD AUTO: 3.52 10^6 /UL (ref 4.18–5.48)
SODIUM SERPL-SCNC: 137 MMOL/L (ref 135–145)
WBC # BLD AUTO: 8.9 10^3/UL (ref 3.5–10.8)

## 2019-06-03 RX ADMIN — TAMSULOSIN HYDROCHLORIDE SCH MG: 0.4 CAPSULE ORAL at 21:29

## 2019-06-03 RX ADMIN — SODIUM CHLORIDE SCH MLS/HR: 900 IRRIGANT IRRIGATION at 09:48

## 2019-06-03 RX ADMIN — LOSARTAN POTASSIUM SCH MG: 25 TABLET, FILM COATED ORAL at 09:45

## 2019-06-03 RX ADMIN — ACETAMINOPHEN PRN MG: 325 TABLET ORAL at 06:13

## 2019-06-03 RX ADMIN — ASPIRIN SCH MG: 81 TABLET, CHEWABLE ORAL at 09:44

## 2019-06-03 RX ADMIN — NYSTATIN SCH APPLIC: 100000 POWDER TOPICAL at 09:45

## 2019-06-03 RX ADMIN — METOPROLOL TARTRATE SCH MG: 25 TABLET, FILM COATED ORAL at 09:44

## 2019-06-03 RX ADMIN — INSULIN LISPRO SCH: 100 INJECTION, SOLUTION INTRAVENOUS; SUBCUTANEOUS at 16:05

## 2019-06-03 RX ADMIN — INSULIN LISPRO SCH UNITS: 100 INJECTION, SOLUTION INTRAVENOUS; SUBCUTANEOUS at 08:07

## 2019-06-03 RX ADMIN — INSULIN LISPRO SCH UNITS: 100 INJECTION, SOLUTION INTRAVENOUS; SUBCUTANEOUS at 21:29

## 2019-06-03 RX ADMIN — NYSTATIN SCH: 100000 POWDER TOPICAL at 21:34

## 2019-06-03 RX ADMIN — ACETAMINOPHEN PRN MG: 325 TABLET ORAL at 17:47

## 2019-06-03 RX ADMIN — DOCUSATE SODIUM SCH MG: 100 CAPSULE, LIQUID FILLED ORAL at 21:28

## 2019-06-03 RX ADMIN — DOCUSATE SODIUM SCH MG: 100 CAPSULE, LIQUID FILLED ORAL at 09:44

## 2019-06-03 RX ADMIN — INSULIN LISPRO SCH UNITS: 100 INJECTION, SOLUTION INTRAVENOUS; SUBCUTANEOUS at 12:53

## 2019-06-03 NOTE — PN
Subjective


Date of Service: 19


Interval History: 





Patient seen and examined. Post US abdomen, no acute distress. No chest pain, 

no dizziness, no abdominal pain, no SOB. No further complaints.





Objective


Active Medications: 








Acetaminophen (Tylenol Tab*)  650 mg PO Q4H PRN


   PRN Reason: FEVER/PAIN


   Last Admin: 19 06:13 Dose:  650 mg


Al Hydrox/Mg Hydrox/Simethicone (Maalox Plus*)  30 ml PO Q6H PRN


   PRN Reason: INDIGESTION


Aspirin (Aspirin 81 Mg Chew Tab*)  81 mg PO DAILY [facility]


   Last Admin: 19 09:44 Dose:  81 mg


Dextrose (D50w Syringe 50 Ml*)  12.5 gm IV PUSH .FOR FS < 60 - SS PRN


   PRN Reason: FS < 60


Docusate Sodium (Colace Cap*)  100 mg PO BID [facility]


   Last Admin: 19 09:44 Dose:  100 mg


Hydralazine HCl (Apresoline Iv*)  5 mg IV SLOW PU Q6H PRN


   PRN Reason: BLOOD PRESSURE


Sodium Chloride (Ns 0.9% 1000 Ml**)  1,000 mls @ 75 mls/hr IV PER RATE [facility]


   Last Admin: 19 09:48 Dose:  75 mls/hr


Insulin Human Lispro (Humalog*)  0 units SUBCUT ACHS [facility]; Protocol


   Last Admin: 19 16:05 Dose:  Not Given


Losartan Potassium (Cozaar Tab*)  50 mg PO DAILY [facility]


   Last Admin: 19 09:45 Dose:  50 mg


Metoprolol Tartrate (Lopressor Tab*)  12.5 mg PO DAILY [facility]


   Last Admin: 19 09:44 Dose:  12.5 mg


Nystatin (Nystatin Top Powder*)  1 applic TOPICAL BID [facility]


   Last Admin: 19 09:45 Dose:  1 applic


Tamsulosin HCl (Flomax Cap*)  0.4 mg PO BEDTIME [facility]


   Last Admin: 19 21:13 Dose:  0.4 mg








 Vital Signs - 8 hr











  19





  10:00 11:00 12:01


 


Temperature   98.5 F


 


Pulse Rate 82 70 78


 


Respiratory 22 21 18





Rate   


 


Blood Pressure 138/66 138/81 130/48





(mmHg)   


 


O2 Sat by Pulse 98 98 97





Oximetry   











Oxygen Devices in Use Now: None


Appearance: alert,NAD


Eyes: No Scleral Icterus, PERRLA


Ears/Nose/Mouth/Throat: NL Teeth, Lips, Gums, Mucous Membranes Moist


Neck: NL Appearance and Movements; NL JVP, Trachea Midline


Respiratory: Symmetrical Chest Expansion and Respiratory Effort, Clear to 

Auscultation


Cardiovascular: NL Sounds; No Murmurs; No JVD, RRR, No Edema


Abdominal: NL Sounds; No Tenderness; No Distention, No Hepatosplenomegaly


Extremities: No Edema, No Clubbing, Cyanosis


Skin: No Rash or Ulcers


Neurological: Alert and Oriented x 3, NL Sensation


Nutrition: Taking PO's


Result Diagrams: 


 19 06:00





 19 06:00


Microbiology and Other Data: 


 Microbiology











 19 11:30 Nasal Screen MRSA (PCR) - Final





 Nasal    Mrsa Not Detected











Diagnostic Imaging: 





*[facility]*


[place] [address]


Phone: [phone]


Fax #: [phone]


-------------------------------------------------------------------


Transthoracic Echocardiogram


Patient: Benigno,    Height:  68 in /     MRN:        [id number]


         Isidro STUBBS              172.7 cm


:     1934   Weight:  195.6 lb /  Study Date: 2019


                               88.9 kg


Age:     85           BP:      136 / 64    Accession#: [id number]


Gender:  M            BMI/BSA: 29.8 kg/m^2 HR:         81 bpm


                               / 2.09 m^2


*Sonographer: * [facility] Cintron [facility]


*Referring Physician: * Angélica Gordon


*Reading Physician: * Jose Patino MD


-------------------------------------------------------------------


Indications:   Syncope.


-------------------------------------------------------------------


History:   Atrial fibrillation.  Risk factors:  Hypertension.


Diabetes mellitus. Dyslipidemia.


-------------------------------------------------------------------


Conclusions


Summary:


1. Left ventricle: Systolic function is normal. The estimated


   ejection fraction is 55-60%. Wall motion is normal; there are no


   regional wall motion abnormalities.


2. Mitral valve: There is no significant regurgitation.


3. Aortic valve: There is no evidence of stenosis.


4. Tricuspid valve: There is no significant regurgitation.


5. Pericardium, extracardiac: There is no significant pericardial


   effusion.


-------------------------------------------------------------------


Study data:  Transthoracic echocardiogram.  Procedure:


Transthoracic echocardiography was performed. Image quality was


adequate. Intravenous Definity , 2 mlswas administered. Image


This report is only to be considered final once signed by the Provider(s) as 

displayed in the "<Electronically Signed by >" field (s). Absence of a 


signature indicates the report is in a draft status and still needs to be 

finalized. In the event this document was created by someone other than the 


signing Provider, the individual initiating the document will be listed in the 

"Entered by:" or "Dictated by:" fields.








Patient Name:         ISIDRO CEDEÑO                                        

                        Medical Record#: [id number]


Ordering Physician: Bryant Klein MD                                       

                        Acct.#: [id number]


:     1934         Age: 85   Sex: M                                   

                        Location: INTENSIVE CARE UNIT


Exam Date: 19 151                                                       

                        ADM Status: ADM IN


Order Information:                         US ABDOMINAL AORTA SCREENING


Accession Number:                          [id number]


CPT:                                       [id number]


Indication: Assess for aneurysm of the abdominal aorta.





Comparison: No relevant prior exams available on the [facility] PACS for comparison.





Technique: Ultrasound of the abdominal aorta and common iliac arteries.





Report: The proximal, mid, and distal segments of the abdominal aorta measure 

up to 2.2 x


1.7, 2.0 x 1.8, and 1.9 x 1.9 cm orthogonal diameter respectively. The RIGHT 

and LEFT


common iliac arteries measure up to 1.2 x 1.3 and 1.1 x 1.4 cm orthogonal 

diameter


respectively. 





IMPRESSION: 


#. Negative for aneurysm of the abdominal aorta.





Patient Name:         ISIDRO CEDEÑO                                        

                        Medical Record#: [id number]


Ordering Physician: Angélica Gordon MD                                          

                        Acct.#: [id number]


:     1934         Age: 85   Sex: M                                   

                        [facility]


Exam Date: 19                                                       

                        ADM Status: ADM IN


Order Information:                         CT BRAIN WO


Accession Number:                          [id number]


CPT:                                       [id number]


EXAM: 


 CT Head Without Contrast 





EXAM DATE/TIME: 


 2019 6:34 AM 





CLINICAL HISTORY: 


 85 years old, male; Signs and symptoms; Dizziness and other: Syncope 





TECHNIQUE: 


 Imaging protocol: Axial computed tomography images of the head without 


contrast. 


 Radiation optimization: All CT scans at this facility use at least one of 


these dose optimization techniques: automated exposure control; mA and/or kV 


adjustment per patient size (includes targeted exams where dose is matched to 


clinical indication); or iterative reconstruction. 





COMPARISON: 


 BRAIN WO CT BRAIN WO 2017 9:32 PM 





FINDINGS: 


 Brain: The cortical sulci are prominent, a finding also present on the 


previous study. There is demyelinization of the white matter. 


 Ventricles: The ventricles are prominent, a finding also present on the 


previous study. 


 Bones/joints: Unremarkable. No acute fracture. 


 Sinuses: Visualized sinuses are unremarkable. No fluid levels. 


 Mastoid air cells: Visualized mastoid air cells are well aerated. No mastoid 


effusion. 


 Orbits: The patient is status post bilateral intraocular lens replacement 


surgeries. 


 Soft tissues: Calcifications are seen in the subcutaneous fat of the skull 


suggesting vascular calcifications, a finding also present on the previous 


study. 


 Vasculature: There are calcifications of the vertebral arteries, a finding 


also present on the previous study. There are calcifications of the carotid 


siphons, a finding also present on the previous study. 





IMPRESSION: 


Cerebral atrophy. 


There is no significant interval change from the previous study.





To contact [facility] with a general question: [facility] - [phone]


For direct physician to physician contact: Physician Hotline - [phone]


[facility] [place] ([facility] Facility ID #[id number])





____________________________________________________________


<Electronically signed by Mateus Reid MD in OV>  19 0753





This report is only to be considered final once signed by the Provider(s) as 

displayed in the "<Electronically Signed by >" field (s). Absence of a 


signature indicates the report is in a draft status and still needs to be 

finalized. In the event this document was created by someone other than the 


signing Provider, the individual initiating the document will be listed in the 

"Entered by:" or "Dictated by:" fields.


                                                                 1 of 2








Assess/Plan/Problems-Billing


Assessment: 





This is an 85 year old male with dementia, diabetes and HTN that presented from 

SNF with syncopal episode.





- Patient Problems


(1) Syncope


Code(s): R55 - SYNCOPE AND COLLAPSE   SNOMED Code(s): 945742008


   Comment: 


 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 

current dose, HR was 30s when asleep; also hx of PAF (on xarelto)


 - Per cardiology, continue monitoring on tele for accelerated HR


 - Possiblity of pacemaker if continued changes noted


 - Abd US with no aneurysm, advance diet   





(2) Dementia


Code(s): F03.90 - UNSPECIFIED DEMENTIA WITHOUT BEHAVIORAL DISTURBANCE   SNOMED 

Code(s): 62880530


   Comment: 


 - supportive care   





(3) Diabetes


Code(s): E11.9 - TYPE 2 DIABETES MELLITUS WITHOUT COMPLICATIONS   SNOMED Code(s)

: 54204722


   Comment: 


 - Stable, continue SS coverage and accuchecks ACHS   





(4) HTN (hypertension)


Code(s): I10 - ESSENTIAL (PRIMARY) HYPERTENSION   SNOMED Code(s): 99410884


   Comment: 


 - Losartan and amlodipine, PRN hydralzaine as needed   





(5) Electrolyte abnormality


Code(s): E87.8 - OTH DISORDERS OF ELECTROLYTE AND FLUID BALANCE, NEC   SNOMED 

Code(s): 011218618


   Comment: 


 - Repleted, Goal: mag >2.0, K>4.0


 - Monitor daily labs   





(6) DVT prophylaxis


Code(s): Z29.9 - ENCOUNTER FOR PROPHYLACTIC MEASURES, UNSPECIFIED   SNOMED Code(

s): 370297684


   Comment: 


 - xarelto   


Status and Disposition: 





Inpatient

## 2019-06-03 NOTE — PN
Progress Note





- Progress Note


Date of Service: 06/03/19


Note: 





Patient ripped out IV - getting gentle IVFs - Vitals all normal.  Ok to d/c IV 

at this point.

## 2019-06-03 NOTE — ECHO
*[facility]*

[place] [address]

Phone: [phone]

Fax #: [phone]



-------------------------------------------------------------------

Transthoracic Echocardiogram



Patient: Benigno,    Height:  68 in /     MRN:        [id number]

         Donovan STUBBS              172.7 cm

:     1934   Weight:  195.6 lb /  Study Date: 2019

                               88.9 kg

Age:     85           BP:      136 / 64    Accession#: [id number]

Gender:  M            BMI/BSA: 29.8 kg/m^2 HR:         81 bpm

                               / 2.09 m^2



*Sonographer: * Lisa Cintron [facility]

 

*Referring Physician: * Angélica Gordon

*Reading Physician: * Jose Patino MD



-------------------------------------------------------------------

Indications:   Syncope.



-------------------------------------------------------------------

History:   Atrial fibrillation.  Risk factors:  Hypertension.

Diabetes mellitus. Dyslipidemia.



-------------------------------------------------------------------

Conclusions



Summary:



1. Left ventricle: Systolic function is normal. The estimated

   ejection fraction is 55-60%. Wall motion is normal; there are no

   regional wall motion abnormalities.

2. Mitral valve: There is no significant regurgitation.

3. Aortic valve: There is no evidence of stenosis.

4. Tricuspid valve: There is no significant regurgitation.

5. Pericardium, extracardiac: There is no significant pericardial

   effusion.



-------------------------------------------------------------------

Study data:  Transthoracic echocardiogram.  Procedure:

Transthoracic echocardiography was performed. Image quality was

adequate. Intravenous Definity , 2 mlswas administered. Image

enhancement administered by JANELL Freire.  Complete 2D, spectral

Doppler, and color flow Doppler.  Location:  ICU  Patient status:

Inpatient. Patient room number: 11.  Rhythm:  Normal sinus rhythm.

 

-------------------------------------------------------------------

Findings



Left ventricle:  The cavity size is normal. Wall thickness is

mildly increased. Systolic function is normal. The estimated

ejection fraction is 55-60%. Wall motion is normal; there are no

regional wall motion abnormalities. There is no consistent Doppler

evidence of clinically significant diastolic dysfunction.

Right ventricle:  The cavity size is normal. Systolic function is

normal.

Left atrium:  The atrium is normal in size.

Right atrium:  The atrium is normal in size.

Mitral valve:  The annulus is mildly calcified. The leaflets are

normal thickness.  There is no evidence of stenosis.   There is no

significant regurgitation.

Aortic valve:   The valve is probably trileaflet. The leaflets are

normal thickness.  There is no evidence of stenosis.   There is no

significant regurgitation.

Tricuspid valve:  The leaflets are normal thickness.  There is no

evidence of stenosis.   There is no significant regurgitation.

Pulmonic valve:   Not well visualized. There is no significant

regurgitation.

Aorta:  Aortic arch: The aortic arch is appears normal.

The aortic root is not dilated.

Pericardium:  There is no significant pericardial effusion.

Pulmonary arteries:

Not well visualized.

Systemic veins:

Inferior vena cava: The vessel is normal in size. The respirophasic

diameter changes are in the normal range (&gt;= 50%).



-------------------------------------------------------------------

Measurements



 Left ventricle            Value        Ref         Aortic valve               Value        Ref

 FERNANDO, LAX                  4.3   cm     4.2 - 5.8   Dale diam, ED               2.0   cm     ----

 ESD, LAX                  3.0   cm     2.5 - 4.0   Dale diam/bsa, ED           1.0   cm/m^2 ----

 FS, LAX                   30    %      25 - 43     Peak v, S                  1.44  m/sec  ----

 PW, ED, LAX       (H)     1.1   cm     0.6 - 1.0   VTI, S                     35.0  cm     ----

 EF                        58    %      52 - 72     Mean grad, S               5.1   mm Hg  ----

 E&apos;, lat dale, TDI  (L)     7.0   cm/sec &gt;=10.0      Peak grad, S               8.3   mm Hg  -
---

 E/e&apos;, lat dale,            14           ----------  LVOT/AV, VTI ratio         0.71         ----


 TDI

 E&apos;, med dale, TDI  (L)     6.0   cm/sec &gt;=7.0       Mitral valve               Value        R
ef

 E/e&apos;, med dale,            16           ----------  Peak E                     0.97  m/sec  ----


 TDI                                                Peak A                     1.29  m/sec  ----

 E&apos;, avg, TDI              6.5   cm/sec ----------  VTI leaflet coapt          32.7  cm     ----


 E/e&apos;, avg, TDI    (H)     15           &lt;=14        Decel time                 106   ms     -
---

                                                    PHT                        76    ms     ----

 LVOT                      Value        Ref         Mean grad, D               3.2   mm Hg  ----

 Peak noé, S               1.01  m/sec  ----------  Peak grad, D               7.6   mm Hg  ----

 VTI, S                    24.9  cm     ----------  Peak E/A ratio             0.75         ----

 Peak grad, S              4     mm Hg  ----------  MVA, PHT                   2.9   cm^2   ----

 Mean grad, S              3     mm Hg  ----------

                                                    Pulmonic valve             Value        Ref

 Ventricular septum        Value        Ref         Peak v, S                  0.91  m/sec  ----

 IVS, ED           (H)     1.1   cm     0.6 - 1.0   Peak grad, S               3.3   mm Hg  ----

 

 Right ventricle           Value        Ref         Aortic root                Value        Ref

 FERNANDO, LAX                  2.5   cm     ----------  Root diam                  2.7   cm     &lt;4.2

 FERNANDO major ax, A4C (L)     3.7   cm     5.9 - 8.3

                                                    Ascending aorta            Value        Ref

 Left atrium               Value        Ref         AAo AP diam, S             2.9   cm     ----

 AP dim, ES        (L)     2.60  cm     3.00 -

                                        4.00        Aortic arch                Value        Ref

 ML dim, A4C               4.6   cm     ----------  Arch diam                  2.7   cm     ----

 SI dim, A4C               5.2   cm     ----------

 Vol/bsa, ES, 2-p          23    ml/m^2 16 - 34     Decending aorta            Value        Ref

                                                    Patricia peak noé               0.47  m/sec  ----

 Right atrium              Value        Ref

 SI dim, ES                4.7   cm     3.4 - 5.3   Inferior vena cava         Value        Ref

 ML dim, ES, A4C           4.2   cm     2.6 - 4.4   Diam                       1.6   cm     ----

 Estimated RAP             3     mm Hg  ----------

 

Legend:

(L)  and  (H)  maria del carmen values outside specified reference range.



Prepared and electronically signed by



Jose Patino MD

2019 11:38

## 2019-06-04 RX ADMIN — TAMSULOSIN HYDROCHLORIDE SCH MG: 0.4 CAPSULE ORAL at 21:09

## 2019-06-04 RX ADMIN — INSULIN LISPRO SCH UNITS: 100 INJECTION, SOLUTION INTRAVENOUS; SUBCUTANEOUS at 16:41

## 2019-06-04 RX ADMIN — INSULIN LISPRO SCH UNITS: 100 INJECTION, SOLUTION INTRAVENOUS; SUBCUTANEOUS at 11:47

## 2019-06-04 RX ADMIN — NYSTATIN SCH APPLIC: 100000 POWDER TOPICAL at 21:11

## 2019-06-04 RX ADMIN — ACETAMINOPHEN PRN MG: 325 TABLET ORAL at 03:42

## 2019-06-04 RX ADMIN — INSULIN LISPRO SCH UNITS: 100 INJECTION, SOLUTION INTRAVENOUS; SUBCUTANEOUS at 07:57

## 2019-06-04 RX ADMIN — ACETAMINOPHEN PRN MG: 325 TABLET ORAL at 15:45

## 2019-06-04 RX ADMIN — ASPIRIN SCH MG: 81 TABLET, CHEWABLE ORAL at 07:57

## 2019-06-04 RX ADMIN — NYSTATIN SCH APPLIC: 100000 POWDER TOPICAL at 07:58

## 2019-06-04 RX ADMIN — LOSARTAN POTASSIUM SCH MG: 25 TABLET, FILM COATED ORAL at 07:57

## 2019-06-04 RX ADMIN — INSULIN LISPRO SCH UNITS: 100 INJECTION, SOLUTION INTRAVENOUS; SUBCUTANEOUS at 21:08

## 2019-06-04 RX ADMIN — METOPROLOL TARTRATE SCH MG: 25 TABLET, FILM COATED ORAL at 07:57

## 2019-06-04 RX ADMIN — DOCUSATE SODIUM SCH MG: 100 CAPSULE, LIQUID FILLED ORAL at 07:57

## 2019-06-04 RX ADMIN — DOCUSATE SODIUM SCH MG: 100 CAPSULE, LIQUID FILLED ORAL at 21:09

## 2019-06-04 NOTE — PN
Subjective


Date of Service: 19


Interval History: 





Patient seen and examined. Denies any dizziness, no chest pain, or syncope. No 

SOB. Initial plan was to discharge patient today, however, after assessment, 

patient became bradycardic again. Discussed at length with family regarding 

pacemaker.





Objective


Active Medications: 








Acetaminophen (Tylenol Tab*)  650 mg PO Q4H PRN


   PRN Reason: FEVER/PAIN


   Last Admin: 19 15:45 Dose:  650 mg


Al Hydrox/Mg Hydrox/Simethicone (Maalox Plus*)  30 ml PO Q6H PRN


   PRN Reason: INDIGESTION


Aspirin (Aspirin 81 Mg Chew Tab*)  81 mg PO DAILY [facility]


   Last Admin: 19 07:57 Dose:  81 mg


Atropine Sulfate (Atropine 1mg/Ml Inj*)  0.5 mg IV PUSH Q5M PRN


   PRN Reason: BRADYCARDIA


Dextrose (D50w Syringe 50 Ml*)  12.5 gm IV PUSH .FOR FS < 60 - SS PRN


   PRN Reason: FS < 60


Diazepam (Valium Tab(*))  5 mg PO ONCALL ONE


   Stop: 19 08:01


Docusate Sodium (Colace Cap*)  100 mg PO BID [facility]


   Last Admin: 19 07:57 Dose:  100 mg


Hydralazine HCl (Apresoline Iv*)  5 mg IV SLOW PU Q6H PRN


   PRN Reason: BLOOD PRESSURE


Cefazolin Sodium/Dextrose (Kefzol 2 Gm Premix In Ors(*))  2 gm in 50 mls @ 100 

mls/hr IVPB ONCALL ONE


   Stop: 19 08:29


Sodium Chloride (Ns 0.9% 1000 Ml**)  1,000 mls @ 75 mls/hr IV PER RATE [facility]


Cefazolin Sodium 1 gm/ Sodium (Chloride)  10 mls @ 0 mls/hr .SEE ORDER ONCALL 

ONE


   Stop: 19 08:01


Insulin Human Lispro (Humalog*)  0 units SUBCUT ACHS [facility]; Protocol


   Last Admin: 19 16:41 Dose:  4 units


Losartan Potassium (Cozaar Tab*)  50 mg PO DAILY [facility]


   Last Admin: 19 07:57 Dose:  50 mg


Melatonin (Melatonin)  3 mg PO BEDTIME [facility]


   Last Admin: 19 21:29 Dose:  3 mg


Methylprednisolone Sodium Succinate (Solu-Medrol 40 Mg)  40 mg IV ONCALL ONE


   Stop: 19 08:01


Nystatin (Nystatin Top Powder*)  1 applic TOPICAL BID [facility]


   Last Admin: 19 07:58 Dose:  1 applic


Tamsulosin HCl (Flomax Cap*)  0.4 mg PO BEDTIME [facility]


   Last Admin: 19 21:29 Dose:  0.4 mg








 Vital Signs - 8 hr











  19





  16:05


 


Temperature 99.3 F


 


Pulse Rate 63


 


Respiratory 18





Rate 


 


Blood Pressure 142/45





(mmHg) 


 


O2 Sat by Pulse 100





Oximetry 











Oxygen Devices in Use Now: None


Appearance: alert, NAD


Eyes: No Scleral Icterus, PERRLA


Ears/Nose/Mouth/Throat: NL Teeth, Lips, Gums, Mucous Membranes Moist


Neck: NL Appearance and Movements; NL JVP, Trachea Midline


Respiratory: Symmetrical Chest Expansion and Respiratory Effort, Clear to 

Auscultation


Cardiovascular: NL Sounds; No Murmurs; No JVD, RRR, No Edema


Abdominal: NL Sounds; No Tenderness; No Distention


Skin: No Rash or Ulcers, No Nodules or Sclerosis


Neurological: Alert and Oriented x 3, NL Sensation


Nutrition: Taking PO's


Result Diagrams: 


 19 06:00





 19 06:00


Microbiology and Other Data: 


 Microbiology











 19 11:30 Nasal Screen MRSA (PCR) - Final





 Nasal    Mrsa Not Detected











Diagnostic Imaging: 





*[facility]*


[place] [address]


Phone: [phone]


Fax #: [phone]


-------------------------------------------------------------------


Transthoracic Echocardiogram


Patient: Benigno,    Height:  68 in /     MRN:        [id number]


         Isidro STUBBS              172.7 cm


:     1934   Weight:  195.6 lb /  Study Date: 2019


                               88.9 kg


Age:     85           BP:      136 / 64    Accession#: [id number]


Gender:  M            BMI/BSA: 29.8 kg/m^2 HR:         81 bpm


                               / 2.09 m^2


*Sonographer: * Lisa Cintron [facility]


*Referring Physician: * Angélica Gordon


*Reading Physician: * Jose Patino MD


-------------------------------------------------------------------


Indications:   Syncope.


-------------------------------------------------------------------


History:   Atrial fibrillation.  Risk factors:  Hypertension.


Diabetes mellitus. Dyslipidemia.


-------------------------------------------------------------------


Conclusions


Summary:


1. Left ventricle: Systolic function is normal. The estimated


   ejection fraction is 55-60%. Wall motion is normal; there are no


   regional wall motion abnormalities.


2. Mitral valve: There is no significant regurgitation.


3. Aortic valve: There is no evidence of stenosis.


4. Tricuspid valve: There is no significant regurgitation.


5. Pericardium, extracardiac: There is no significant pericardial


   effusion.


-------------------------------------------------------------------


Study data:  Transthoracic echocardiogram.  Procedure:


Transthoracic echocardiography was performed. Image quality was


adequate. Intravenous Definity , 2 mlswas administered. Image


This report is only to be considered final once signed by the Provider(s) as 

displayed in the "<Electronically Signed by >" field (s). Absence of a 


signature indicates the report is in a draft status and still needs to be 

finalized. In the event this document was created by someone other than the 


signing Provider, the individual initiating the document will be listed in the 

"Entered by:" or "Dictated by:" fields.








Patient Name:         ISIDRO CEDEÑO                                        

                        Medical Record#: [id number]


Ordering Physician: Bryant Klein MD                                       

                        Acct.#: [id number]


:     1934         Age: 85   Sex: M                                   

                        Location: INTENSIVE CARE UNIT


Exam Date: 19 1510                                                       

                        ADM Status: ADM IN


Order Information:                         US ABDOMINAL AORTA SCREENING


Accession Number:                          [id number]


CPT:                                       [id number]


Indication: Assess for aneurysm of the abdominal aorta.





Comparison: No relevant prior exams available on the [facility] PACS for comparison.





Technique: Ultrasound of the abdominal aorta and common iliac arteries.





Report: The proximal, mid, and distal segments of the abdominal aorta measure 

up to 2.2 x


1.7, 2.0 x 1.8, and 1.9 x 1.9 cm orthogonal diameter respectively. The RIGHT 

and LEFT


common iliac arteries measure up to 1.2 x 1.3 and 1.1 x 1.4 cm orthogonal 

diameter


respectively. 





IMPRESSION: 


#. Negative for aneurysm of the abdominal aorta.





Patient Name:         ISIDRO CEDEÑO                                        

                        Medical Record#: [id number]


Ordering Physician: Angélica Gordon MD                                          

                        Acct.#: [id number]


:     1934         Age: 85   Sex: M                                   

                        [facility]


Exam Date: 19 0522                                                       

                        ADM Status: ADM IN


Order Information:                         CT BRAIN WO


Accession Number:                          [id number]


CPT:                                       [id number]


EXAM: 


 CT Head Without Contrast 





EXAM DATE/TIME: 


 2019 6:34 AM 





CLINICAL HISTORY: 


 85 years old, male; Signs and symptoms; Dizziness and other: Syncope 





TECHNIQUE: 


 Imaging protocol: Axial computed tomography images of the head without 


contrast. 


 Radiation optimization: All CT scans at this facility use at least one of 


these dose optimization techniques: automated exposure control; mA and/or kV 


adjustment per patient size (includes targeted exams where dose is matched to 


clinical indication); or iterative reconstruction. 





COMPARISON: 


 BRAIN WO CT BRAIN WO 2017 9:32 PM 





FINDINGS: 


 Brain: The cortical sulci are prominent, a finding also present on the 


previous study. There is demyelinization of the white matter. 


 Ventricles: The ventricles are prominent, a finding also present on the 


previous study. 


 Bones/joints: Unremarkable. No acute fracture. 


 Sinuses: Visualized sinuses are unremarkable. No fluid levels. 


 Mastoid air cells: Visualized mastoid air cells are well aerated. No mastoid 


effusion. 


 Orbits: The patient is status post bilateral intraocular lens replacement 


surgeries. 


 Soft tissues: Calcifications are seen in the subcutaneous fat of the skull 


suggesting vascular calcifications, a finding also present on the previous 


study. 


 Vasculature: There are calcifications of the vertebral arteries, a finding 


also present on the previous study. There are calcifications of the carotid 


siphons, a finding also present on the previous study. 





IMPRESSION: 


Cerebral atrophy. 


There is no significant interval change from the previous study.





To contact [facility] with a general question: [facility] - [phone]


For direct physician to physician contact: Physician Hotline - [phone]


[facility] ([facility] Facility ID #[id number])





____________________________________________________________


<Electronically signed by Mateus Reid MD in OV>  19 0753





This report is only to be considered final once signed by the Provider(s) as 

displayed in the "<Electronically Signed by >" field (s). Absence of a 


signature indicates the report is in a draft status and still needs to be 

finalized. In the event this document was created by someone other than the 


signing Provider, the individual initiating the document will be listed in the 

"Entered by:" or "Dictated by:" fields.


                                                                 1 of 2








Assess/Plan/Problems-Billing


Assessment: 





This is an 85 year old male with dementia, diabetes and HTN that presented from 

SNF with syncopal episode.





- Patient Problems


(1) Syncope


Code(s): R55 - SYNCOPE AND COLLAPSE   SNOMED Code(s): 230256958


   Comment: 


 - Initial monitoring for symptomatic bradycardia/tachy-tara syndrome and 

waiting for wash out of beta blocker. HR was 30s when asleep at admission


 - Notable bradycardia then high grade 2nd degree AVB today


 - Plan for pacemaker insertion in AM, NPO after midnight


 - Xarelto has been held since admission


 - Abd US with no aneurysm   





(2) Dementia


Code(s): F03.90 - UNSPECIFIED DEMENTIA WITHOUT BEHAVIORAL DISTURBANCE   SNOMED 

Code(s): 73888076


   Comment: 


 - Appropriate, mild


 - supportive care   





(3) Diabetes


Code(s): E11.9 - TYPE 2 DIABETES MELLITUS WITHOUT COMPLICATIONS   SNOMED Code(s)

: 26422064


   Comment: 


 - Stable, continue SS coverage and accuchecks ACHS   





(4) HTN (hypertension)


Code(s): I10 - ESSENTIAL (PRIMARY) HYPERTENSION   SNOMED Code(s): 21584789


   Comment: 


 - Losartan and amlodipine, PRN hydralzaine as needed   





(5) Electrolyte abnormality


Code(s): E87.8 - OTH DISORDERS OF ELECTROLYTE AND FLUID BALANCE, NEC   SNOMED 

Code(s): 317682943


   Comment: 


 - Repleted, Goal: mag >2.0, K>4.0


 - Monitor daily labs   





(6) DVT prophylaxis


Code(s): Z29.9 - ENCOUNTER FOR PROPHYLACTIC MEASURES, UNSPECIFIED   SNOMED Code(

s): 070115357


   Comment: 


 - xarelto   





(7) Paroxysmal A-fib


Code(s): I48.0 - PAROXYSMAL ATRIAL FIBRILLATION   SNOMED Code(s): 740961626


   Comment: 


 - Betablocker held in light of heart block and bradycardia


 - Xarelto held at admission and prior to procedure


 - Restart meds post-procedure when clear by cardiology   


Status and Disposition: 





Inpatient for PM insertion Wednesday. Discharge back to [place] Thursday.

## 2019-06-05 PROCEDURE — 02H63JZ INSERTION OF PACEMAKER LEAD INTO RIGHT ATRIUM, PERCUTANEOUS APPROACH: ICD-10-PCS | Performed by: SPECIALIST

## 2019-06-05 PROCEDURE — 0JH606Z INSERTION OF PACEMAKER, DUAL CHAMBER INTO CHEST SUBCUTANEOUS TISSUE AND FASCIA, OPEN APPROACH: ICD-10-PCS | Performed by: SPECIALIST

## 2019-06-05 PROCEDURE — 02HK3JZ INSERTION OF PACEMAKER LEAD INTO RIGHT VENTRICLE, PERCUTANEOUS APPROACH: ICD-10-PCS | Performed by: SPECIALIST

## 2019-06-05 RX ADMIN — ACETAMINOPHEN PRN MG: 325 TABLET ORAL at 02:12

## 2019-06-05 RX ADMIN — LOSARTAN POTASSIUM SCH: 25 TABLET, FILM COATED ORAL at 10:23

## 2019-06-05 RX ADMIN — INSULIN LISPRO SCH: 100 INJECTION, SOLUTION INTRAVENOUS; SUBCUTANEOUS at 07:48

## 2019-06-05 RX ADMIN — NYSTATIN SCH APPLIC: 100000 POWDER TOPICAL at 15:43

## 2019-06-05 RX ADMIN — METOPROLOL SUCCINATE SCH MG: 50 TABLET, EXTENDED RELEASE ORAL at 14:14

## 2019-06-05 RX ADMIN — ASPIRIN SCH: 81 TABLET, CHEWABLE ORAL at 10:23

## 2019-06-05 RX ADMIN — INSULIN LISPRO SCH: 100 INJECTION, SOLUTION INTRAVENOUS; SUBCUTANEOUS at 12:55

## 2019-06-05 RX ADMIN — INSULIN LISPRO SCH UNITS: 100 INJECTION, SOLUTION INTRAVENOUS; SUBCUTANEOUS at 16:39

## 2019-06-05 RX ADMIN — CEFAZOLIN SCH MLS/HR: 1 INJECTION, POWDER, FOR SOLUTION INTRAVENOUS at 16:00

## 2019-06-05 RX ADMIN — NYSTATIN SCH APPLIC: 100000 POWDER TOPICAL at 22:39

## 2019-06-05 RX ADMIN — INSULIN LISPRO SCH UNITS: 100 INJECTION, SOLUTION INTRAVENOUS; SUBCUTANEOUS at 22:39

## 2019-06-05 RX ADMIN — NYSTATIN SCH: 100000 POWDER TOPICAL at 13:46

## 2019-06-05 RX ADMIN — TAMSULOSIN HYDROCHLORIDE SCH MG: 0.4 CAPSULE ORAL at 22:39

## 2019-06-05 RX ADMIN — DOCUSATE SODIUM SCH MG: 100 CAPSULE, LIQUID FILLED ORAL at 22:39

## 2019-06-05 RX ADMIN — DOCUSATE SODIUM SCH: 100 CAPSULE, LIQUID FILLED ORAL at 10:23

## 2019-06-05 NOTE — PN
Subjective


Date of Service: 19


Interval History: 





Pt is in bed with family members at bedside.  He is feeling well.  States he 

has had no episodes of dizziness, lightheadedness, syncope recently.  He has 

some L arm discomfort, and is s/p PPM insertion today.  He is ready for d/c to 

[facility].








Objective


Active Medications: 








Acetaminophen (Tylenol Tab*)  650 mg PO Q4H PRN


Al Hydrox/Mg Hydrox/Simethicone (Maalox Plus*)  30 ml PO Q6H PRN


Aspirin (Aspirin 81 Mg Chew Tab*)  81 mg PO DAILY [facility]EIN[facility]


Atropine Sulfate (Atropine 1mg/Ml Inj*)  0.5 mg IV PUSH Q5M PRN


Dextrose (D50w Syringe 50 Ml*)  12.5 gm IV PUSH .FOR FS < 60 - SS PRN


Docusate Sodium (Colace Cap*)  100 mg PO BID ZEINAB


Hydralazine HCl (Apresoline Iv*)  5 mg IV SLOW PU Q6H PRN


Sodium Chloride (Ns 0.9% 1000 Ml**)  1,000 mls @ 75 mls/hr IV PER RATE ZEINAB


Cefazolin Sodium 1 gm/ Sodium (Chloride)  100 mls @ 400 mls/hr IVPB Q8H ZEINBA


Insulin Human Lispro (Humalog*)  0 units SUBCUT ACHS ZEINAB; Protocol


Losartan Potassium (Cozaar Tab*)  50 mg PO DAILY ZEINAB


Melatonin (Melatonin)  3 mg PO BEDTIME ZEINAB


Metoprolol Succinate (Toprol Xl Tab*)  50 mg PO DAILY [facility]AB


Nystatin (Nystatin Top Powder*)  1 applic TOPICAL BID ZEINAB


Tamsulosin HCl (Flomax Cap*)  0.4 mg PO BEDTIME ZEINAB








Vital Signs:











Temp Pulse Resp BP Pulse Ox


 


 97.8 F   61   16   150/44   98 


 


 19 13:48  19 16:00  19 13:48  19 16:24  19 16:44











Oxygen Devices in Use Now: None


Appearance: Pt is sitting up in bed; he appears to be in no acute distress.  He 

is cooperative, appropriate.


Eyes: No Scleral Icterus, PERRLA


Ears/Nose/Mouth/Throat: NL Teeth, Lips, Gums, Mucous Membranes Moist


Neck: NL Appearance and Movements; NL JVP, Trachea Midline


Respiratory: Symmetrical Chest Expansion and Respiratory Effort, Clear to 

Auscultation


Cardiovascular: NL Sounds; No Murmurs; No JVD, RRR, No Edema


Abdominal: NL Sounds; No Tenderness; No Distention, No Hepatosplenomegaly


Extremities: No Edema, No Clubbing, Cyanosis


Neurological: Alert and Oriented x 3


Result Diagrams: 


 19 06:00





 19 06:00


Microbiology and Other Data: 


 Microbiology











 19 11:30 Nasal Screen MRSA (PCR) - Final





 Nasal    Mrsa Not Detected











Diagnostic Imaging: 





*[facility]*


[place] [address]


Phone: [phone]


Fax #: [phone]


-------------------------------------------------------------------


Transthoracic Echocardiogram


Patient: Benigno,    Height:  68 in /     MRN:        [id number]


         Isidro STUBBS              172.7 cm


:     1934   Weight:  195.6 lb /  Study Date: 2019


                               88.9 kg


Age:     85           BP:      136 / 64    Accession#: [id number]


Gender:  M            BMI/BSA: 29.8 kg/m^2 HR:         81 bpm


                               / 2.09 m^2


*Sonographer: Lisa Orourke [facility]


*Referring Physician: * Angélica Gordon


*Reading Physician: * Jose Patino MD


-------------------------------------------------------------------


Indications:   Syncope.


-------------------------------------------------------------------


History:   Atrial fibrillation.  Risk factors:  Hypertension.


Diabetes mellitus. Dyslipidemia.


-------------------------------------------------------------------


Conclusions


Summary:


1. Left ventricle: Systolic function is normal. The estimated


   ejection fraction is 55-60%. Wall motion is normal; there are no


   regional wall motion abnormalities.


2. Mitral valve: There is no significant regurgitation.


3. Aortic valve: There is no evidence of stenosis.


4. Tricuspid valve: There is no significant regurgitation.


5. Pericardium, extracardiac: There is no significant pericardial


   effusion.


-------------------------------------------------------------------


Study data:  Transthoracic echocardiogram.  Procedure:


Transthoracic echocardiography was performed. Image quality was


adequate. Intravenous Definity , 2 mlswas administered. Image


This report is only to be considered final once signed by the Provider(s) as 

displayed in the "<Electronically Signed by >" field (s). Absence of a 


signature indicates the report is in a draft status and still needs to be 

finalized. In the event this document was created by someone other than the 


signing Provider, the individual initiating the document will be listed in the 

"Entered by:" or "Dictated by:" fields.








Patient Name:         ISIDRO CEDEÑO                                        

                        Medical Record#: [id number]


Ordering Physician: Bryant Klein MD                                       

                        Acct.#: [id number]


:     1934         Age: 85   Sex: M                                   

                        Location: INTENSIVE CARE UNIT


Exam Date: 19 1510                                                       

                        ADM Status: ADM IN


Order Information:                         US ABDOMINAL AORTA SCREENING


Accession Number:                          [id number]


CPT:                                       [id number]


Indication: Assess for aneurysm of the abdominal aorta.





Comparison: No relevant prior exams available on the [facility] [place] for comparison.





Technique: Ultrasound of the abdominal aorta and common iliac arteries.





Report: The proximal, mid, and distal segments of the abdominal aorta measure 

up to 2.2 x


1.7, 2.0 x 1.8, and 1.9 x 1.9 cm orthogonal diameter respectively. The RIGHT 

and LEFT


common iliac arteries measure up to 1.2 x 1.3 and 1.1 x 1.4 cm orthogonal 

diameter


respectively. 





IMPRESSION: 


#. Negative for aneurysm of the abdominal aorta.





Patient Name:         ISIDRO CEDEÑO                                        

                        Medical Record#: [id number]


Ordering Physician: Angélica Gordon MD                                          

                        Acct.#: [id number]


:     1934         Age: 85   Sex: M                                   

                        Location: [facility] DEPT - INPATIENT HOLD


Exam Date: 19                                                       

                        ADM Status: ADM IN


Order Information:                         CT BRAIN WO


Accession Number:                          [id number]


CPT:                                       [id number]


EXAM: 


 CT Head Without Contrast 





EXAM DATE/TIME: 


 2019 6:34 AM 





CLINICAL HISTORY: 


 85 years old, male; Signs and symptoms; Dizziness and other: Syncope 





TECHNIQUE: 


 Imaging protocol: Axial computed tomography images of the head without 


contrast. 


 Radiation optimization: All CT scans at this facility use at least one of 


these dose optimization techniques: automated exposure control; mA and/or kV 


adjustment per patient size (includes targeted exams where dose is matched to 


clinical indication); or iterative reconstruction. 





COMPARISON: 


 BRAIN WO CT BRAIN WO 2017 9:32 PM 





FINDINGS: 


 Brain: The cortical sulci are prominent, a finding also present on the 


previous study. There is demyelinization of the white matter. 


 Ventricles: The ventricles are prominent, a finding also present on the 


previous study. 


 Bones/joints: Unremarkable. No acute fracture. 


 Sinuses: Visualized sinuses are unremarkable. No fluid levels. 


 Mastoid air cells: Visualized mastoid air cells are well aerated. No mastoid 


effusion. 


 Orbits: The patient is status post bilateral intraocular lens replacement 


surgeries. 


 Soft tissues: Calcifications are seen in the subcutaneous fat of the skull 


suggesting vascular calcifications, a finding also present on the previous 


study. 


 Vasculature: There are calcifications of the vertebral arteries, a finding 


also present on the previous study. There are calcifications of the carotid 


siphons, a finding also present on the previous study. 





IMPRESSION: 


Cerebral atrophy. 


There is no significant interval change from the previous study.





To contact [facility] with a general question: [facility] - [phone]


For direct physician to physician contact: Physician Hotline - [phone]


[facility] ([facility] Facility ID #[id number])





____________________________________________________________


<Electronically signed by Mateus Reid MD in OV>  19 0753





This report is only to be considered final once signed by the Provider(s) as 

displayed in the "<Electronically Signed by >" field (s). Absence of a 


signature indicates the report is in a draft status and still needs to be 

finalized. In the event this document was created by someone other than the 


signing Provider, the individual initiating the document will be listed in the 

"Entered by:" or "Dictated by:" fields.


                                                                 1 of 2








Assess/Plan/Problems-Billing


Assessment: 





This is an 85 year old male with dementia, diabetes and HTN that presented from 

SNF with syncopal episode.








- Patient Problems


(1) Syncope


Comment: 


 - Initial monitoring for symptomatic bradycardia/tachy-tara syndrome and 

waiting for wash out of beta blocker. HR was 30s when asleep at admission


 - Notable bradycardia then high grade 2nd degree AVB


 - PPM insertion today


 - Xarelto has been held since admission; will restart Friday


 - Abd US with no aneurysm   





(2) Paroxysmal A-fib


Comment: 


 - Betablocker held in light of heart block and bradycardia; restart today


 - Xarelto held at admission and prior to procedure; restart Friday   





(3) Diabetes


Comment: 


 - Stable, continue SS coverage and accuchecks ACHS   





(4) HTN (hypertension)


Comment: 


 - Losartan and amlodipine, PRN hydralzaine as needed   





(5) Dementia


Comment: 


 - Appropriate, mild


 - supportive care   





(6) DVT prophylaxis


Comment: 


-Held xarelto for PPM insertion; will restart Friday   


Status and Disposition: 





Inpatient for PM insertion Wednesday.  Discharge back to [place] Thursday.

## 2019-06-05 NOTE — PN
Subjective


Date of Service: 06/05/19 - CC: syncope


Interval History: 





The patient was seen this AM with his daughter prior to pacemaker implantation.


Questions were answered, the procedure risks/benefits and details were 

discussed in depth.


The patient is Right handed.


The patients NSVT was discussed with the daughter, she was aware of this.





The patient denies fevers, chills, no history of CP.





Medications


Active Medications: 








Acetaminophen (Tylenol Tab*)  650 mg PO Q4H PRN


   PRN Reason: FEVER/PAIN


   Last Admin: 06/05/19 02:12 Dose:  650 mg


Al Hydrox/Mg Hydrox/Simethicone (Maalox Plus*)  30 ml PO Q6H PRN


   PRN Reason: INDIGESTION


Aspirin (Aspirin 81 Mg Chew Tab*)  81 mg PO DAILY [facility]


   Last Admin: 06/05/19 10:23 Dose:  Not Given


Atropine Sulfate (Atropine 1mg/Ml Inj*)  0.5 mg IV PUSH Q5M PRN


   PRN Reason: BRADYCARDIA


Dextrose (D50w Syringe 50 Ml*)  12.5 gm IV PUSH .FOR FS < 60 - SS PRN


   PRN Reason: FS < 60


Docusate Sodium (Colace Cap*)  100 mg PO BID [facility]


   Last Admin: 06/05/19 10:23 Dose:  Not Given


Hydralazine HCl (Apresoline Iv*)  5 mg IV SLOW PU Q6H PRN


   PRN Reason: BLOOD PRESSURE


Sodium Chloride (Ns 0.9% 1000 Ml**)  1,000 mls @ 75 mls/hr IV PER RATE [facility]


   Last Admin: 06/05/19 04:39 Dose:  75 mls/hr


Cefazolin Sodium 1 gm/ Sodium (Chloride)  100 mls @ 400 mls/hr IVPB Q8H [facility]


   Stop: 06/06/19 08:14


   Last Admin: 06/05/19 16:00 Dose:  400 mls/hr


Insulin Human Lispro (Humalog*)  0 units SUBCUT ACHS [facility]; Protocol


   Last Admin: 06/05/19 16:39 Dose:  8 units


Losartan Potassium (Cozaar Tab*)  50 mg PO DAILY [facility]


   Last Admin: 06/05/19 10:23 Dose:  Not Given


Melatonin (Melatonin)  3 mg PO BEDTIME [facility]


   Last Admin: 06/04/19 21:09 Dose:  3 mg


Metoprolol Succinate (Toprol Xl Tab*)  50 mg PO DAILY [facility]


   Last Admin: 06/05/19 14:14 Dose:  50 mg


Nystatin (Nystatin Top Powder*)  1 applic TOPICAL BID [facility]


   Last Admin: 06/05/19 15:43 Dose:  1 applic


Tamsulosin HCl (Flomax Cap*)  0.4 mg PO BEDTIME [facility]


   Last Admin: 06/04/19 21:09 Dose:  0.4 mg








Objective


Vital Signs:











Temp Pulse Resp BP Pulse Ox


 


 97.8 F   61   16   150/44   98 


 


 06/05/19 13:48  06/05/19 16:00  06/05/19 13:48  06/05/19 16:24  06/05/19 16:44











Oxygen Devices in Use Now: None


Appearance: Older gentleman, lying in bed at 40 degrees, talkative and 

comfortable.


Eyes: No Scleral Icterus, PERRLA


Ears/Nose/Mouth/Throat: Clear Oropharnyx


Neck: No Thyroid Enlargement, Masses


Respiratory: Symmetrical Chest Expansion and Respiratory Effort


Cardiovascular: RRR


Abdominal: NL Sounds; No Tenderness; No Distention - some centripetal obesity 

noted.


Extremities: No Edema


Neurological: - - evidence of memory loss, dementia.


Lines/Tubes/Other Access: Clean, Dry and Intact Peripheral IV


Laboratory Results: 


 





 06/03/19 06:00 





 06/03/19 06:00 





 











Total Bilirubin  0.30 mg/dL (0.2-1.0)   06/02/19  03:10    


 


AST  11 U/L (13-39)  L  06/02/19  03:10    


 


ALT  7 U/L (7-52)   06/02/19  03:10    


 


Alkaline Phosphatase  49 U/L ()   06/02/19  03:10    


 


Total Protein  5.6 g/dL (6.4-8.9)  L  06/02/19  03:10    


 


Albumin  3.2 g/dL (3.2-5.2)   06/02/19  03:10    


 


Globulin  2.4 g/dL (2-4)   06/02/19  03:10    


 


Albumin/Globulin Ratio  1.3  (1-3)   06/02/19  03:10    


 


TSH  4.21 mcIU/mL (0.34-5.60)   06/02/19  03:10    








 











  06/02/19 06/02/19





  03:10 16:30


 


Troponin I  0.00  0.00











Diagnostic Imaging: 








-------------------------------------------------------------------


Transthoracic Echocardiogram


-------------------------------------------------------------------


Conclusions


Summary:


1. Left ventricle: Systolic function is normal. The estimated


   ejection fraction is 55-60%. Wall motion is normal; there are no


   regional wall motion abnormalities.


2. Mitral valve: There is no significant regurgitation.


3. Aortic valve: There is no evidence of stenosis.


4. Tricuspid valve: There is no significant regurgitation.


5. Pericardium, extracardiac: There is no significant pericardial


   effusion.


-------------------------------------------------------------------





EKG Data: 





Monitor: intermittent 2nd degree HB, sinus bradycardia, PVC's and NSVT runs (

after metoprolol stopped).





Assessment/Plan





86 yo with PAF, syncope, sinus bradycardia and high degree AVB.  NSVT off beta 

blocker.  Preserved LV systolic function.  





Bradycardia:


   For dual chamber pacemaker implantation today (completed).





PAF:


   Metoprolol resumed t cindy.


   Resume anticoagulation Friday morning (some backbleeding during the 

procedure therefore wait 48 hours prior to resuming).





NSVT


   Metoprolol for now.


   Low risk of sustained VT with normal EF.


   Rebound off metoprolol could have contributed as could CHF/volume overload.


   If any concern for ischemia and it is felt he would be an interventional 

candidate could stress, chemical (trops negative this admission).


   Stress test post  pacer not ideal due to leads and arm positioning, medical 

management with BB unless persists or other potential ischemic concerns.


   


CHF:


   Venous pressures in SVC elevated in the cath lab.


   Coughing could be related to elevate PAPr.


   BP's up


   Outpatient medications included Lasix 60 mg/day


   I gave one time Lasix 20 mg, but consider resuming daily diuretic at a lower 

dose.

## 2019-06-06 VITALS — SYSTOLIC BLOOD PRESSURE: 147 MMHG | DIASTOLIC BLOOD PRESSURE: 30 MMHG

## 2019-06-06 RX ADMIN — CEFAZOLIN SCH MLS/HR: 1 INJECTION, POWDER, FOR SOLUTION INTRAVENOUS at 00:13

## 2019-06-06 RX ADMIN — METOPROLOL SUCCINATE SCH MG: 50 TABLET, EXTENDED RELEASE ORAL at 08:02

## 2019-06-06 RX ADMIN — LOSARTAN POTASSIUM SCH MG: 25 TABLET, FILM COATED ORAL at 08:02

## 2019-06-06 RX ADMIN — ASPIRIN SCH MG: 81 TABLET, CHEWABLE ORAL at 08:01

## 2019-06-06 RX ADMIN — ACETAMINOPHEN PRN MG: 325 TABLET ORAL at 03:57

## 2019-06-06 RX ADMIN — DOCUSATE SODIUM SCH MG: 100 CAPSULE, LIQUID FILLED ORAL at 08:02

## 2019-06-06 RX ADMIN — INSULIN LISPRO SCH UNITS: 100 INJECTION, SOLUTION INTRAVENOUS; SUBCUTANEOUS at 09:01

## 2019-06-06 RX ADMIN — INSULIN LISPRO SCH: 100 INJECTION, SOLUTION INTRAVENOUS; SUBCUTANEOUS at 12:07

## 2019-06-06 RX ADMIN — NYSTATIN SCH APPLIC: 100000 POWDER TOPICAL at 09:02

## 2019-06-06 RX ADMIN — CEFAZOLIN SCH MLS/HR: 1 INJECTION, POWDER, FOR SOLUTION INTRAVENOUS at 08:04

## 2019-06-06 NOTE — PN
<Abigail Guadarrama - Last Filed: 06/06/19 09:44>





Subjective


Date of Service: 06/06/19 - s/p DC PPM 6/5/2019


Interval History: 





The patient was seen this AM with his daughter and son at bedside. Patient 

resting in bed and offers no complaints. Denies chest pain, sob, dizziness or 

palpitations. 





Medications


Active Medications: 








Acetaminophen (Tylenol Tab*)  650 mg PO Q4H PRN


   PRN Reason: FEVER/PAIN


   Last Admin: 06/06/19 03:57 Dose:  650 mg


Al Hydrox/Mg Hydrox/Simethicone (Maalox Plus*)  30 ml PO Q6H PRN


   PRN Reason: INDIGESTION


Aspirin (Aspirin 81 Mg Chew Tab*)  81 mg PO DAILY [facility]


   Last Admin: 06/06/19 08:01 Dose:  81 mg


Atropine Sulfate (Atropine 1mg/Ml Inj*)  0.5 mg IV PUSH Q5M PRN


   PRN Reason: BRADYCARDIA


Dextrose (D50w Syringe 50 Ml*)  12.5 gm IV PUSH .FOR FS < 60 - SS PRN


   PRN Reason: FS < 60


Docusate Sodium (Colace Cap*)  100 mg PO BID [facility]


   Last Admin: 06/06/19 08:02 Dose:  100 mg


Furosemide (Lasix Tab*)  40 mg PO DAILY [facility]


   Last Admin: 06/06/19 09:01 Dose:  40 mg


Hydralazine HCl (Apresoline Iv*)  5 mg IV SLOW PU Q6H PRN


   PRN Reason: BLOOD PRESSURE


Sodium Chloride (Ns 0.9% 1000 Ml**)  1,000 mls @ 75 mls/hr IV PER RATE [facility]


   Last Admin: 06/05/19 04:39 Dose:  75 mls/hr


Insulin Glargine (Lantus(*))  24 units SUBCUT Q24H [facility]


   Last Admin: 06/06/19 09:01 Dose:  24 units


Insulin Glargine (Lantus(*))  20 units SUBCUT Q24H [facility]


Insulin Human Lispro (Humalog*)  0 units SUBCUT ACHS [facility]; Protocol


   Last Admin: 06/06/19 09:01 Dose:  4 units


Losartan Potassium (Cozaar Tab*)  50 mg PO DAILY [facility]


   Last Admin: 06/06/19 08:02 Dose:  50 mg


Melatonin (Melatonin)  3 mg PO BEDTIME [facility]


   Last Admin: 06/05/19 22:39 Dose:  3 mg


Metoprolol Succinate (Toprol Xl Tab*)  50 mg PO DAILY [facility]


   Last Admin: 06/06/19 08:02 Dose:  50 mg


Nystatin (Nystatin Top Powder*)  1 applic TOPICAL BID [facility]


   Last Admin: 06/06/19 09:02 Dose:  1 applic


Tamsulosin HCl (Flomax Cap*)  0.4 mg PO BEDTIME [facility]


   Last Admin: 06/05/19 22:39 Dose:  0.4 mg








Objective


Vital Signs:











Temp Pulse Resp BP Pulse Ox


 


 98.1 F   59   18   128/41   98 


 


 06/06/19 08:04  06/06/19 08:04  06/06/19 09:35  06/06/19 08:04  06/06/19 08:04











Oxygen Devices in Use Now: None


Appearance: Older gentleman, lying in bed at 40 degrees, talkative and 

comfortable.


Eyes: No Scleral Icterus, PERRLA


Ears/Nose/Mouth/Throat: Clear Oropharnyx


Neck: No Thyroid Enlargement, Masses


Respiratory: Symmetrical Chest Expansion and Respiratory Effort


Cardiovascular: RRR, - - Left anterior chest device site inspected. dressing 

removed, scant blood noted on dressing. device site tender to palpation. no 

evidence of hematoma. edges well approximated with 8 staples in situ. no 

bleeding or oozing norted. 


Abdominal: NL Sounds; No Tenderness; No Distention - some centripetal obesity 

noted.


Extremities: No Edema


Neurological: - - evidence of memory loss, dementia.


Lines/Tubes/Other Access: Clean, Dry and Intact Peripheral IV


Laboratory Results: 


 





 06/03/19 06:00 





 06/03/19 06:00 





 











Total Bilirubin  0.30 mg/dL (0.2-1.0)   06/02/19  03:10    


 


AST  11 U/L (13-39)  L  06/02/19  03:10    


 


ALT  7 U/L (7-52)   06/02/19  03:10    


 


Alkaline Phosphatase  49 U/L ()   06/02/19  03:10    


 


Total Protein  5.6 g/dL (6.4-8.9)  L  06/02/19  03:10    


 


Albumin  3.2 g/dL (3.2-5.2)   06/02/19  03:10    


 


Globulin  2.4 g/dL (2-4)   06/02/19  03:10    


 


Albumin/Globulin Ratio  1.3  (1-3)   06/02/19  03:10    


 


TSH  4.21 mcIU/mL (0.34-5.60)   06/02/19  03:10    








 











  06/02/19 06/02/19





  03:10 16:30


 


Troponin I  0.00  0.00








 Laboratory Results - last 24 hr











  06/05/19 06/05/19 06/05/19





  14:17 16:06 21:44


 


POC Glucose (mg/dL)  283 H  317 H  373 H














  06/06/19





  07:57


 


POC Glucose (mg/dL)  163 H











Diagnostic Imaging: 








-------------------------------------------------------------------


Transthoracic Echocardiogram


-------------------------------------------------------------------


Conclusions


Summary:


1. Left ventricle: Systolic function is normal. The estimated


   ejection fraction is 55-60%. Wall motion is normal; there are no


   regional wall motion abnormalities.


2. Mitral valve: There is no significant regurgitation.


3. Aortic valve: There is no evidence of stenosis.


4. Tricuspid valve: There is no significant regurgitation.


5. Pericardium, extracardiac: There is no significant pericardial


   effusion.


-------------------------------------------------------------------





EKG Data: 





Monitor: intermittent 2nd degree HB, sinus bradycardia, PVC's and NSVT runs (

after metoprolol stopped).





EKG 6/6/2019; AV Paced rate 84





Assessment/Plan











#`1 Symptomatic High Degree AVB; S/p DC PPM with Dr. Doty on 6/5/2019. 

Device check today reviewed, atrial pacing threshold 0.4ms, RV pacing threshold 

0.4ms. Per report normal device function. CXR is pending. Xarelto to be resumed 

6/7/2019. Device site was inspected no evidence of pocket hematoma. edges well 

approximated. He will need to be discharged home on Keflexx 250mg Po TIDx 3 

days. He is to f/u on 6/14/2019 with Lilian Kaufman Np at our [facility] office 

location for wound evaluation and tentative removal of staples. Patient does 

not drive thus driving restrictions is not applicable. He is to use left upper 

arm immobilizer for 6 weeks. He may shower starting tomorrow but it to not soak 

device site wound or put shower head directly on wound. He resides at [facility] 

thus he may need a script or some type of direction to staff to change dressing 

daily for 3 days then leave open to air starting 6/10/2019. N olifting LUE 

above shoulder x6 weeks. Await this morning CXR to r/o pneumothorax. 





#2 h/o PAF; Chads Vasc >2 on Xarelto therapy and Lopressor therapy. Xarelto may 

be resumed 6/7/2019 please ensure staff at [facility] is aware of this. 





#3 NSVT; Patient had a 6-7 beat count of NSVT at least 3 times on 6/4/2019. 

Bblocker has been resumed. LVEF preserved. will address in f/u. No episodes 

overnight just occasional PVCs on telemetry 





#4 CHF with elevated pressures on SVC in cath lab per Dr. Doty. Recommend 

resuming prior outpatient diuretic upon discharge. BP is stable at this time.





#5 Disposition pending course. patient full code. Will await CXR. Images were 

reviewed but quality is poor. Will likely sign off case as long as lead 

placement is appropriate and ne pneumothorax. Per patient's daughter [facility] 

prescribes patient's medication not pharmacy. I believe they will honor 

discharge scripts and have their provider prescribe them. It might be a good 

idea to reach out to them prior to discharge to ensure he receives Keflex 250mg 

PO TID in a timely fashion and they are aware to not restart Xarelto until 6/7/ 2019. Will d/w Dr. Doty. 














Attending: Gabbie Doty





<Yuba,Gabbie - Last Filed: 06/06/19 12:41>





Objective


Vital Signs:











Temp Pulse Resp BP Pulse Ox


 


 96.7 F   63   18   147/30   98 


 


 06/06/19 11:28  06/06/19 11:28  06/06/19 11:28  06/06/19 11:28  06/06/19 11:28











Laboratory Results: 


 





 06/03/19 06:00 





 06/03/19 06:00 





 











Total Bilirubin  0.30 mg/dL (0.2-1.0)   06/02/19  03:10    


 


AST  11 U/L (13-39)  L  06/02/19  03:10    


 


ALT  7 U/L (7-52)   06/02/19  03:10    


 


Alkaline Phosphatase  49 U/L ()   06/02/19  03:10    


 


Total Protein  5.6 g/dL (6.4-8.9)  L  06/02/19  03:10    


 


Albumin  3.2 g/dL (3.2-5.2)   06/02/19  03:10    


 


Globulin  2.4 g/dL (2-4)   06/02/19  03:10    


 


Albumin/Globulin Ratio  1.3  (1-3)   06/02/19  03:10    


 


TSH  4.21 mcIU/mL (0.34-5.60)   06/02/19  03:10    








 











  06/02/19 06/02/19





  03:10 16:30


 


Troponin I  0.00  0.00














Assessment/Plan





I saw the patient and his son and daughter.


No c/o.


They asked if the patient still needed Xarelto as no afib this admission.


All pacer related questions answered. 





Incision w/o evidence infection, hematoma or ecchymosis.


Lungs clear. 


Plan: As above except: Xarelto to start Saturday instead of Friday due to back 

bleeding from SVC at implant.


Family to discuss long term Xarelto at  upcoming post implant OV's.

## 2019-06-06 NOTE — DS
CC:  [facility] Physician *

 

DISCHARGE SUMMARY:

 

DATE OF ADMISSION:  06/02/19

 

DATE OF DISCHARGE:  06/04/19

 

PRIMARY CARE PROVIDER:  [facility] Physician.

 

ATTENDING PHYSICIAN:  Angélica Gordon MD * (dictated by ANA Friedman).

 

PRIMARY DIAGNOSES:

1.  Syncope.

2.  Sinus bradycardia.

3.  AV Block

4.  Status post permanent pacemaker placement.

 

STUDIES WHILE IN THE HOSPITAL:  Brain CT 06/02/19, impression:  Cerebral 
atrophy. There is no significant interval change from the previous study.

 

Transthoracic echocardiogram 06/02/19, impression:  Left ventricle systolic 
function normal, ejection fraction 55% to 60%.

 

Abdominal ultrasound 06/03/19, impression:  Negative for aneurysm of the 
abdominal aorta.

 

Chest x-ray 06/06/19, impression:  Small bilateral pleural effusions, no 
appreciable pneumothorax.

 

DISCHARGE MEDICATIONS:  Home medications:

1.  Insulin glargine 20 units subcu at bedtime.

2.  Insulin Lispro 4 units subcu b.i.d.

3.  Furosemide 60 mg p.o. daily.

4.  Sennoside/docusate sodium 2 each p.o. b.i.d.

5.  Insulin glargine 24 units subcu daily in a.m.

6.  Aspirin 81 mg p.o. daily.

7.  Losartan 25 mg p.o. daily.

8.  Tamsulosin 0.4 mg p.o. at bedtime.

 

New home medications:  

1.  Metoprolol succinate XL 50 mg p.o. daily.

2.  Keflex 250 mg p.o. t.i.d. for 3 days

 

Changed home medications:  Rivaroxaban 20 mg p.o. daily - discussed with family 
their considering resuming medication.  If they would like to resume medication
, restart Friday morning.

 

HISTORY OF PRESENT ILLNESS/HOSPITAL COURSE:  Mr. Pelaez is an 85-year-old 
male with a past medical history of diabetes, hypertension, atrial fibrillation
, who presented to the ER on 06/02/19 after a syncopal episode at his home of 
[facility].  His heart rate was checked and was noted to be in the 
30s.  He was brought to the emergency room, and in the emergency room, the 
patient was noted to have heart rate dip into the 30s when asleep and up to the 
60s while awake.  The patient was admitted to the ICU.  Cardiology was 
consulted.  Echocardiogram was ordered and revealed normal ejection fraction.  
It was recommended that a pacemaker be inserted, due to high-degree AV block, 
sinus bradycardia, and syncope.  Beta-blocker was discontinued for short a 
period of time to allow for placement of permanent pacemaker.  This was 
inserted without issue.  His metoprolol was then resumed.  The patient was 
given instruction for left upper extremity immobilization, antibiotics x3 days, 
and to follow up with Cardiology on 06/14/19.  At the time of discharge, the 
patient complains of a small amount of left upper chest soreness at the area of 
pacemaker insertion.  He denies chest pain, shortness of breath, dizziness, 
lightheadedness, syncope.  He denies abdominal pain, nausea, vomiting, diarrhea
, constipation.  He denies lower extremity swelling.  Mr. Pelaez is stable 
for discharge to [place]. Condition good.

 

PHYSICAL EXAMINATION:  Vital Signs:  Temperature 98.1, temporal, heart rate 59, 
respiratory rate 18, oxygen saturation 98% on room air, blood pressure 128/41. 
General:  Mr. Pelaez is a well-developed, well-nourished overweight elderly 
white male, who is sitting up in bed.  His left arm is placed in 
immobilization.  His lower extremities are elevated.  He appears to be in no 
acute distress.  He is cooperative and appropriate.  HEENT:  PERRL.  
Extraocular movements intact.  The patient is hard of hearing, wears hearing 
aids.  Oral mucous membranes are moist. There are no lesions.  Cardiovascular:  
Regular rate and rhythm with S1, S2 present.  No murmurs, rubs, clicks, or 
gallops.  No JVD.  Telemetry reveals normal sinus rhythm with intermittent 
pacing.  Pulmonary:  Symmetrical chest expansion without use of accessory 
muscles.  Lungs:  Clear to auscultation bilaterally without wheeze, rhonchi, or 
rubs.  Abdomen:  Bowel sounds noted in all quadrants. The abdomen is soft.  
There is no tenderness to palpation.  Extremities:  Skin is warm and smooth 
bilaterally.  There is no clubbing or cyanosis.  Trace edema bilateral lower 
extremities.  Neuro:  The patient is awake.  He is alert and oriented x3.  He 
is able to move all of his extremities.

 

DISCHARGE PLAN:  Mr. Pelaez will be discharged to [place].

 

ACTIVITY:  Activity as tolerated, left upper extremity immobilization and sling.

 

DIET:  Heart healthy, ADA.

 

MEDICATIONS:  As above.

Restart Xarelto 20 Saturday.  Discuss with family prior to restarting.

Keflex t.i.d. for 3 days

 

FOLLOWUP:

1.  Follow up with primary care provider in 4 to 7 days.

2.  Follow up with Cardiology, Lilian Kaufman, 06/14/19 at 12:45.

 

Return to the ER or nearest hospital if he experienced any worsening of symptoms
, shortness of breath, chest discomfort, high fevers, chills, night sweats, 
dizziness, lightheadedness, loss of consciousness, or any other worrisome signs 
or symptoms.

 

This is a summarized report of a complex medical history and hospital stay.  
For further details, please see the entire medical record.

 

TIME SPENT:  Approximately 35 minutes was spent on discharge, greater than half 
that time was spent face-to-face with the patient discussing discharge plans 
and instructions.

 

____________________________________ ANA VICTOR

 

[id number]/CPS #: [id number]

LOTTIE

## 2019-06-06 NOTE — OP
CC:  Dr. Bryant Klein; Dr. Jose Patino *

 

DATE OF IMPLANT:  06/05/19 - ROOM #449

 

DATE OF BIRTH:  [birth year]

 

SURGEON:  Gabbie Doty MD

 

PRE-OP DIAGNOSES:  High degree AV block, sick sinus syndrome, tachy-tara.

 

POST-OP DIAGNOSES:  High degree AV block, sick sinus syndrome, tachy-tara.

 

PROCEDURE:  Dual-chamber pacemaker implantation.

 

DESCRIPTION OF PROCEDURE:  The indications, risks, and benefits had been 
discussed with the patient in the presence of his daughter.  They were both 
amenable to proceeding.

 

He is right handed and the left subclavian fossa was prepped and draped in the 
usual sterile fashion.  A time-out was called. Using sonography I tried to 
locate the subclavian vein, but the structure imaged was non-compressible and 
we therefore elected to give radio opaque dye in the left upper extremity (in 
hindsight a non- compressible large structure was a distended vein).  The 
patient received Solu-Medrol for dye allergy (itching) prior to the procedure 
and prophylactic antibiotics.  The patient received 10 cc of radio opaque dye 
in the left upper extremity outlining the left subclavian and axillary veins.  
Following this, the patient received 6 mg of versed and 50 mcg of fentanyl for 
sedation.  The patient received approximately 30 cc of 1% lidocaine throughout 
the procedure.  Following local anesthesia in the left subclavian fossa using a 
10-blade knife, a 2.5 cm incision was made in the left subclavian fossa, and 
using Bovie and blunt dissection, it was extended to the level of the 
pectoralis muscle.  Additional lidocaine was infused inferiorly and medially 
and using blunt dissection a small pocket was fashioned.

 

The left subclavian vein was cannulated using a modified Seldinger technique, 
with fluoroscopic guidance the guidewire was inserted into the right atrium.  
The procedure was repeated with a second guidewire.

 

Using fluoroscopic guidance in an introducer technique, the ventricular lead 
was guided into the right ventricle and on to the septum, actively fixed in 
place, and pacing and sensing thresholds initially were good, but the lead 
dislodged.  The lead was repositioned lower in the septum with stable and good 
pacing and sensing thresholds.  Using the second guide-wire in an introducer 
technique, the lead was placed in the right atrial appendage and actively fixed 
in place.  Pacing and sensing thresholds were good.  The leads were then 
attached to the pocket using 0 silk suture; however, the atrial leads 
dislodged.  The suture at the atrial lead was removed.  The atrial lead was 
repositioned to the appendage.  Sensing was a little lower 1 mV, but pacing 
thresholds and lead impedance was stable.  The lead was then again actively 
attached to the ventricle using 0 silk suture.  Pacing and sensing thresholds 
were again checked and found to be stable and good.

 

The pocket was copiously irrigated.  We had trouble with hemostasis due to back 
bleeding around the leads.  This persisted even after all stylette's were 
removed and the leads were sutured to the pocket.  On the atrial lead there was 
no back bleeding, but the ventricle lead continued to have back bleeding and I 
therefore took an extra silk and made a "collar muscle" around the orifice.  
This was effective.  We also placed Surgicel over the leads.  The leads were 
then attached to the generator.  The generator was placed in the pocket.  The 
incision was closed with 2 layers of resorbable suture, 2-0 followed by 4-0.  
There was no further bleeding during the suturing.  Incision was further closed 
with staples and external dressing.

 

FINDINGS:  The device is a Medtronic MRI compatible system.  The pacer is a 
model number WNDR01, serial number [id number].  The atrial lead is Medtronic 
model 5076-52, serial number [id number].  The ventricular lead is a Medtronic 
model 5076-58, serial number [id number].

 

P-waves are sensed at 1.3 mV with an atrial lead impendence of 713 ohms and an 
ventricular pacing threshold of 1 volt of 0.5 msec.

 

R-waves are sensed at 11.3 mV with a ventricular lead impedance of 779 ohms and 
a ventricular pacing threshold was 0.5 volts at 0.5 msec.

 

The patient was hemodynamically stable throughout the procedure.  He was 
uncomfortable near the end, it required some leg positioning.  The procedure 
was otherwise uncomplicated.

 

 [id number]/CPS #: [id number]

[facility]

## 2019-08-04 ENCOUNTER — HOSPITAL ENCOUNTER (INPATIENT)
Dept: HOSPITAL 25 - ED | Age: 84
LOS: 3 days | Discharge: NURSING, REHAB OR LONG-TERM CARE | DRG: 637 | End: 2019-08-07
Attending: INTERNAL MEDICINE | Admitting: INTERNAL MEDICINE
Payer: MEDICARE

## 2019-08-04 DIAGNOSIS — Z79.01: ICD-10-CM

## 2019-08-04 DIAGNOSIS — Z82.3: ICD-10-CM

## 2019-08-04 DIAGNOSIS — I49.5: ICD-10-CM

## 2019-08-04 DIAGNOSIS — Z79.4: ICD-10-CM

## 2019-08-04 DIAGNOSIS — Z95.0: ICD-10-CM

## 2019-08-04 DIAGNOSIS — G31.84: ICD-10-CM

## 2019-08-04 DIAGNOSIS — H91.90: ICD-10-CM

## 2019-08-04 DIAGNOSIS — I95.9: ICD-10-CM

## 2019-08-04 DIAGNOSIS — E86.0: ICD-10-CM

## 2019-08-04 DIAGNOSIS — I21.A1: ICD-10-CM

## 2019-08-04 DIAGNOSIS — Z80.8: ICD-10-CM

## 2019-08-04 DIAGNOSIS — Z88.8: ICD-10-CM

## 2019-08-04 DIAGNOSIS — E87.6: ICD-10-CM

## 2019-08-04 DIAGNOSIS — N40.0: ICD-10-CM

## 2019-08-04 DIAGNOSIS — I11.0: ICD-10-CM

## 2019-08-04 DIAGNOSIS — Z80.49: ICD-10-CM

## 2019-08-04 DIAGNOSIS — Z83.3: ICD-10-CM

## 2019-08-04 DIAGNOSIS — E78.5: ICD-10-CM

## 2019-08-04 DIAGNOSIS — Z88.1: ICD-10-CM

## 2019-08-04 DIAGNOSIS — I48.0: ICD-10-CM

## 2019-08-04 DIAGNOSIS — Z79.899: ICD-10-CM

## 2019-08-04 DIAGNOSIS — E11.649: Primary | ICD-10-CM

## 2019-08-04 DIAGNOSIS — Z91.041: ICD-10-CM

## 2019-08-04 DIAGNOSIS — N17.9: ICD-10-CM
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
## 2019-08-04 DIAGNOSIS — I44.30: ICD-10-CM

## 2019-08-04 DIAGNOSIS — I24.9: ICD-10-CM

## 2019-08-04 DIAGNOSIS — G40.89: ICD-10-CM

## 2019-08-04 DIAGNOSIS — Z87.891: ICD-10-CM

## 2019-08-04 DIAGNOSIS — I50.30: ICD-10-CM

## 2019-08-04 DIAGNOSIS — T38.3X5A: ICD-10-CM

## 2019-08-04 LAB
ALBUMIN SERPL BCG-MCNC: 3.5 G/DL (ref 3.2–5.2)
ALBUMIN/GLOB SERPL: 1.2 {RATIO} (ref 1–3)
ALP SERPL-CCNC: 55 U/L (ref 34–104)
ALT SERPL W P-5'-P-CCNC: 7 U/L (ref 7–52)
ANION GAP SERPL CALC-SCNC: 9 MMOL/L (ref 2–11)
APTT PPP: 36.9 SECONDS (ref 26–38)
AST SERPL-CCNC: (no result) U/L (ref 13–39)
AST SERPL-CCNC: 12 U/L (ref 13–39)
BASOPHILS # BLD AUTO: 0.1 10^3/UL (ref 0–0.2)
BUN SERPL-MCNC: 26 MG/DL (ref 6–24)
BUN/CREAT SERPL: 21.3 (ref 8–20)
CALCIUM SERPL-MCNC: 8.5 MG/DL (ref 8.6–10.3)
CHLORIDE SERPL-SCNC: 104 MMOL/L (ref 101–111)
CHOLEST SERPL-MCNC: 149 MG/DL
EOSINOPHIL # BLD AUTO: 0.1 10^3/UL (ref 0–0.6)
GLOBULIN SER CALC-MCNC: 2.9 G/DL (ref 2–4)
GLUCOSE SERPL-MCNC: 188 MG/DL (ref 70–100)
HCO3 SERPL-SCNC: 23 MMOL/L (ref 22–32)
HCT VFR BLD AUTO: 33 % (ref 42–52)
HDLC SERPL-MCNC: 35.2 MG/DL
HGB BLD-MCNC: 11.2 G/DL (ref 14–18)
INR PPP/BLD: 1.34 (ref 0.82–1.09)
LYMPHOCYTES # BLD AUTO: 1.5 10^3/UL (ref 1–4.8)
MAGNESIUM SERPL-MCNC: 1.8 MG/DL (ref 1.9–2.7)
MCH RBC QN AUTO: 30 PG (ref 27–31)
MCHC RBC AUTO-ENTMCNC: 34 G/DL (ref 31–36)
MCV RBC AUTO: 88 FL (ref 80–94)
MONOCYTES # BLD AUTO: 0.8 10^3/UL (ref 0–0.8)
NEUTROPHILS # BLD AUTO: 5.5 10^3/UL (ref 1.5–7.7)
NRBC # BLD AUTO: 0 10^3/UL
NRBC BLD QL AUTO: 0
PLATELET # BLD AUTO: 324 10^3/UL (ref 150–450)
POTASSIUM SERPL-SCNC: (no result) MMOL/L (ref 3.5–5)
POTASSIUM SERPL-SCNC: 3.1 MMOL/L (ref 3.5–5)
PROT SERPL-MCNC: 6.4 G/DL (ref 6.4–8.9)
RBC # BLD AUTO: 3.79 10^6 /UL (ref 4.18–5.48)
SODIUM SERPL-SCNC: 136 MMOL/L (ref 135–145)
TRIGL SERPL-MCNC: 134 MG/DL
TROPONIN I SERPL-MCNC: 0.05 NG/ML (ref ?–0.04)
TROPONIN I SERPL-MCNC: 0.3 NG/ML (ref ?–0.04)
TROPONIN I SERPL-MCNC: 0.89 NG/ML (ref ?–0.04)
TSH SERPL-ACNC: 2.69 MCIU/ML (ref 0.34–5.6)
WBC # BLD AUTO: 7.9 10^3/UL (ref 3.5–10.8)

## 2019-08-04 PROCEDURE — 83605 ASSAY OF LACTIC ACID: CPT

## 2019-08-04 PROCEDURE — 84443 ASSAY THYROID STIM HORMONE: CPT

## 2019-08-04 PROCEDURE — 36415 COLL VENOUS BLD VENIPUNCTURE: CPT

## 2019-08-04 PROCEDURE — 85025 COMPLETE CBC W/AUTO DIFF WBC: CPT

## 2019-08-04 PROCEDURE — 84100 ASSAY OF PHOSPHORUS: CPT

## 2019-08-04 PROCEDURE — 81003 URINALYSIS AUTO W/O SCOPE: CPT

## 2019-08-04 PROCEDURE — 71045 X-RAY EXAM CHEST 1 VIEW: CPT

## 2019-08-04 PROCEDURE — 85610 PROTHROMBIN TIME: CPT

## 2019-08-04 PROCEDURE — 80048 BASIC METABOLIC PNL TOTAL CA: CPT

## 2019-08-04 PROCEDURE — 83036 HEMOGLOBIN GLYCOSYLATED A1C: CPT

## 2019-08-04 PROCEDURE — 70450 CT HEAD/BRAIN W/O DYE: CPT

## 2019-08-04 PROCEDURE — 80061 LIPID PANEL: CPT

## 2019-08-04 PROCEDURE — 93306 TTE W/DOPPLER COMPLETE: CPT

## 2019-08-04 PROCEDURE — 87641 MR-STAPH DNA AMP PROBE: CPT

## 2019-08-04 PROCEDURE — 83735 ASSAY OF MAGNESIUM: CPT

## 2019-08-04 PROCEDURE — 93005 ELECTROCARDIOGRAM TRACING: CPT

## 2019-08-04 PROCEDURE — 85730 THROMBOPLASTIN TIME PARTIAL: CPT

## 2019-08-04 PROCEDURE — 99285 EMERGENCY DEPT VISIT HI MDM: CPT

## 2019-08-04 PROCEDURE — 84484 ASSAY OF TROPONIN QUANT: CPT

## 2019-08-04 PROCEDURE — C8929 TTE W OR WO FOL WCON,DOPPLER: HCPCS

## 2019-08-04 PROCEDURE — 80053 COMPREHEN METABOLIC PANEL: CPT

## 2019-08-04 RX ADMIN — POTASSIUM CHLORIDE SCH MLS/HR: 200 INJECTION, SOLUTION INTRAVENOUS at 22:43

## 2019-08-04 RX ADMIN — TAMSULOSIN HYDROCHLORIDE SCH MG: 0.4 CAPSULE ORAL at 21:01

## 2019-08-04 RX ADMIN — POTASSIUM CHLORIDE SCH MLS/HR: 200 INJECTION, SOLUTION INTRAVENOUS at 15:44

## 2019-08-04 RX ADMIN — POTASSIUM CHLORIDE SCH MLS/HR: 200 INJECTION, SOLUTION INTRAVENOUS at 19:29

## 2019-08-04 NOTE — XMS REPORT
Continuity of Care Document (CCD)

 Created on:2019



Patient:Isidro Cedeño

Sex:Male

:1934

External Reference #:[id number]





Demographics







 Address  [address]



   [place] [address]

 

 Home Phone  [phone]

 

 Mobile Phone  [phone]

 

 Email Address  [email or web address]

 

 Preferred Language  en

 

 Marital Status  Not  or 

 

 [facility] Affiliation  Unknown

 

 Race  White

 

 Ethnic Group  Not  or 









Author







 Name  LoydCassieKari









Support







 Name  Relationship  Address  Phone

 

 Billy Cedeño  Unavailable  Unavailable  [phone]

 

 GiovanyNing Cedeño  Unavailable  Unavailable  [phone]

 

 Bing Tyler  Unavailable  Unavailable  Unavailable

 

 Aelxis Tyler  Unavailable  Unavailable  Unavailable









Care Team Providers







 Name  Role  Phone

 

 Kim Mead MD  Primary Care Physician  Unavailable









Payers







 Date  Identification Numbers  Payment Provider  Subscriber

 

 Effective: 2012  Policy Number: [id number]  Medicare  Isidro Cedeño









 PayID: [id number]  [address]









 [place] [address]









 Effective: 2012  Policy Number: [id number]  [employer]  Isidro Cedeño









 PayID: [id number]  [address]









 [place] [address]









 Effective: 2017  Policy Number: [id number]  Medicaid  Isidro Cedeño









 Group Name: 1 1  [address]

 

 PayID: [id number]  [place] [address]







Problems







 Active Problems  Provider  Date

 

 Type II diabetes mellitus uncontrolled  Gabi Li M.D.  Onset: 2012

 

 Benign essential hypertension  Elva Daniels M.D.  Onset: 2015

 

 Hyperlipidemia  Gabi Li M.D.  Onset: 2015

 

 Hearing loss  Quiana Bond N.P.  Onset: 2015

 

 Persistent proteinuria associated with type 2  Elva Daniels M.D.  Onset: 2015



 diabetes mellitus    

 

 Diabetic retinopathy  Elva Daniels M.D.  Onset: 2015

 

 Mild non-proliferative diabetic retinopathy    Onset: 









 Note: 









 Benign prostatic hyperplasia  Elva Daniels M.D.  Onset: 2015

 

 Diabetic neuropathy  Elva Daniels M.D.  Onset: 2015

 

 Essential hypertension  Molina Acuña NP  Onset: 2016







Social History







 Type  Date  Description  Comments

 

 Birth Sex    Unknown  

 

 Marital Status  2006    

 

 Lives With    Assisted living  [place]

 

 Occupation    Retired  

 

 Tobacco Use  Start: Unknown End:  Former Cigarette Smoker  Quit 50 years ago



   Unknown    

 

 Smoking Status  Reviewed: 19  Former Cigarette Smoker  Quit 50 years ago

 

 ETOH Use    Denies alcohol use  

 

 Tobacco Use  Start: Unknown End:  Patient is a former  



   Unknown  smoker  

 

 Recreational Drug Use    Denies Drug Use  

 

 Exercise Type/Frequency    walking, some  







Allergies, Adverse Reactions, Alerts







 Active Allergies  Reaction  Severity  Comments  Date

 

 Neosporin      rash  2012

 

 Bacitracin  Hives      2019

 

 Ionated Contrast - Oral & IV Form    Moderate    2019

 

 Polymyxin B  Hives      2019

 

 Hydrochlorothiazide  Hyponatremia  Severe    2019

 

 Triamterene  Hyponatremia  Moderate    2019







Medications







 Active Medications  SIG  Qnty  Indications  Ordering  Date



         Provider  

 

 Eliquis  1 by mouth twice a  180tabs    Lilian Kaufman,  2019



         5mg Tablets  day      N.P.  



           

 

 Diabetic Shoes  as needed  1units    Elva Daniels,  08/10/2015



         M.D.  

 

 Lantus Solostar  24 units daily - 20  45units  E11.65  oMlina Acuña NP  2015



   units at bedtime        



 100Unit/ML Solution          



 Pen-Inject          



           

 

 Onetouch Ultra 2  check blood sugar  1units    Molina Acuña NP  2015



   3-4 times daily        



 w/Device Kit          



           

 

 Onetouch Ultra Blue  test 3 to 4 times a  400units  E11.21  Molina Acuña NP  2015



   day and as needed        



  Strips  DX E11.21 Last seen        



   17        

 

 BD Pen  daily or as needed  100units    Molina Acuña NP  2014



 Needle/Mini/Ultrafi          



 ne/31G X 3/16"          



                31G          



 X 5 mm Misc          



           

 

 Metoprolol  1 by mouth every      Unknown  



 Succinate ER  day        



              50mg          



 Tablets ER 24HR          



           

 

 Losartan Potassium  1 by mouth every      Unknown  



   day        



 25mg Tablets          



           

 

 Flomax  1 by mouth every      Unknown  



        0.4mg  day        



 Capsules          



           

 

 Senna Plus  take 2 tablet by      Unknown  



            8.6-50mg  mouth as needed for        



 Tablets  constipation        



           

 

 Insulin Lispro  4 units subQ bid      Unknown  



           



 100Unit/ML Solution          



 Pen-Inject          



           

 

 Furosemide  3 by mouth every      Unknown  



            20mg  day        



 Tablets          



           









 History Medications









 Amlodipine Besylate  1 by mouth every  30tabs  I10  Kim  2017 -



                  2.5mg  day      [place]  2019



 Tablets          



           

 

 Januvia        Molina Acuña NP  2017 -



      50mg Tablets          2017



           

 

 Cipro  1 tablet twice  10tabs  R39.15  Molina Acuña NP  02/10/2016 -



    250mg Tablets  daily for 5 days        02/15/2016



           

 

 Nyamyc  apply to  QS  B37.9  Elva Daniels,  2015 -



     [id number]  affected area in      M.D.  2016



 Powder  the r groin        



   twice a day x 10        



   days        

 

 Proair HFA  take 1-2 puffs  1units    Molina Acuña NP  2015 -



         108(90Base)  every 4-6 hours        2015



 mcg/Act Aerosol  as needed for        



   shortness of        



   breath.        

 

 Albuterol Sulfate  1 vial every 6  100units    Molina Acuña NP  2015 -



   hour as needed.        2015



 (2.5mg/3ML) 0.083%          



 Nebulizer          



           

 

 Nebulizer  use for  1units    Molina Acuña NP  2015 -



         Device  albuterol        2015



   nebulized        



   solution up to 4        



   times a day.        

 

 Nebulizer  for use 4 times      Molina Acuña NP  2015 -



 Kit/Tubing/Mouthpiece  daily as needed        2015



           



 Kit          

 

 Pulmicort  1/2 ampule twice  60ml    Molina Acuña NP  2015 -



        1mg/2ML  daily        2015



 Suspension          



           

 

 Azithromycin  2 tabs by mouth  6tabs  466.0  Molina Acuña NP  2015 -



           250mg  every day x1        2015



 Tablets  day, 1 tab by        



   mouth every day        



   x 4 days        

 

 Benzonatate  take one or two  20caps  466.0  Molina Acuña NP  2015 -



          100mg  capsules every 8        2015



 Capsules  hours as needed        



   for cough.        

 

 Micardis  Take 1 Tablet By  90tabs    Molina Acuña NP  2014 -



       40mg Tablets  Mouth  Daily        2019



           

 

 Metformin HCL  Take 1 tablet by  180tabs    Molina Acuña NP  10/11/2013 -



            1000mg  mouth  twice a        2019



 Tablets  day        



           

 

 Atorvastatin Calcium  1 by mouth every  90tabs  E78.4  Elva Frances,  10/11/
2013 -



                   10mg  night at      M.D.  2016



 Tablets  bedtime-pt        



   states he is not        



   takimg        

 

 Metformin HCL ER  Take 2 tablets  180tabs    Gabi Li,  2013 -



               500mg  once daily      M.D.  10/11/2013



 Tablets ER 24HR          



           

 

 Atenolol  take 1 tablet by  60tabs    Molina Acuña NP  2013 -



       25mg Tablets  mouth twice        2019



   daily        

 

 Tamiflu  1 po bid x 5  10caps  786.2  Awa Victoria,  2013 -



      75mg Capsules  days      M.D., FACP  2013



           

 

 Flovent HFA  2 puffs twice  1units  786.2  Awa Victoria,  2013 -



          44mcg/Act  daily for 10      M.D., FACP  2013



 Aerosol  days        



           

 

 Incentive Spirometry  Use every hour  1units  786.2  Gabi Li,  03/15/2013 -



   while awake to      M.D.  2015



 Device  encourage cough.        



           

 

 Accu-Check Liliana  Test four times  1units    Gabi Li,  2013 -



 Glucometer  daily or as      M.D.  2015



   needed.  DX:        



   250.02        

 

 Accucheck Liliana Test  test four times  360units    Elva Daniels,  10/03/2012 
-



 Strips  daily or as      M.D.  2015



   needed dx:        



   250.02        

 

 Lantus Solostar Pen  use one daily  100units  250.02  Kim  2012 -



 Needles        Cotton, M.D.  2015

 

 Lantus Solostar  40 units sq  3Months  250.02  Elva Daniels,  2012 -



   daily      M.D.  2015



 100Unit/ML Solution          



 Pen-Inject          



           

 

 Xarelto  1 by mouth every      Unknown   -



      20mg Tablets  day        2019



           

 

 Aspir-Low  1 by mouth every      Unknown   -



        81mg Tablets DR  day        2019



           

 

 Triamterene/Hydrochlor  Take 1 capsule  90caps  I10  Molina Acuña NP   -



 othiazide  by mouth  every        2017



        37.5-25mg  day        



 Capsules          



           

 

 Miralax  17 gm every day      Unknown   -



      [id number] Powder  mixed w/ 8 oz        2019



   water/juice prn        

 

 Fish Oil  1 by mouth every      Unknown   -



       500mg Capsules  day        2019



           

 

 Tylenol  prn      Unknown   -



      325mg Tablets          2019



           

 

 Methylcellulose  2 bid  60units    Unknown   -



              400mg          2016



 Powder          



           

 

 Humalog Mix 75/25  10 units sc bid  1bottle  250.02  Unknown   -



           2012



 (75-25)100Unit/ML          



 Suspension          



           

 

 Lovaza  1 po bid  180caps    Gabi Li,   -



     1gm Capsules        M.D.  10/11/2013



           

 

 Atenolol  1 po qd  90tabs    Unknown   -



       50mg Tablets          2013



           

 

 Micardis  1 by mouth every  90tabs    Gabi Li,   -



       80mg Tablets  day      M.D.  2014



           

 

 Zetia  1 po qd  90tabs    Gabi Li,   -



    10mg Tablets        M.D.  2013



           

 

 Metformin HCL ER  Take 2 tablets  90tabs    Gabi Li,   -



               500mg  daily      M.D.  2013



 Tablets ER 24HR          



           

 

 Aspirin  1 po qd  90tabs    Unknown   -



      81mg Tablets          2016



           







Immunizations







 CPT Code  Status  Date  Vaccine  Reaction  Lot #

 

 [id number]  Given  2017  Influenza Virus Vaccine,    



       Quadrivalent, Split,    



       Preservative Free    

 

 40248  Given  2016  Pneumococcal Conjugate Vaccine    



       13 Valent For Intramuscular Use    

 

 84333  Given  2016  Pneumococcal Conjugate Vaccine    



       13 Valent For Intramuscular Use    

 

 75982  Given  2015  Influenza Virus Vaccine,    



       Quadrivalent, Split,    



       Preservative Free    

 

 62656  Given  2015  Pneumococcal Conjugate Vaccine    [id number]



       13 Valent For Intramuscular Use    

 

 23899  Given  2015  Zoster (Zostavax)    [id number]

 

 02902  Given  2015  Pneumonia Vaccine  No reaction noted  [id number]

 

 19582  Given  2014  Influenza Virus Vaccine,    



       Quadrivalent, Split,    



       Preservative Free    

 

   Given  10/05/2013  Afluria Vaccine    

 

 41157  Given  2013  Tdap -    [id number]



       Tetanus/Diptheria/Acellular    



       Pertussis    

 

 40394  Given  2012  Influenza Virus 3Yrs & Over    

 

   Given  Unknown  Fluvirin Im 3Yrs And Older    







Vital Signs







 Date  Vital  Result  Comment

 

 2019  1:27pm  Height  67 inches  5'7"









 Weight  192.25 lb  

 

 Heart Rate  64 /min  

 

 BP Systolic Sitting  124 mmHg  Rue large cuff

 

 BP Diastolic Sitting  76 mmHg  Rue large cuff

 

 BP Systolic Standing  122 mmHg  Rue

 

 BP Diastolic Standing  70 mmHg  Rue

 

 Respiratory Rate  16 /min  

 

 BMI (Body Mass Index)  30.1 kg/m2  

 

 Ejection Fraction  55-60%  6/3/19









 2019 12:45pm  Height  67 inches  5'7"









 Weight  193.50 lb  w/o shoes

 

 Heart Rate  62 /min  

 

 BP Systolic Sitting  110 mmHg  Rue reg cuff

 

 BP Diastolic Sitting  60 mmHg  Rue reg cuff

 

 Respiratory Rate  16 /min  

 

 BMI (Body Mass Index)  30.3 kg/m2  









 2017  3:11pm  Weight  221.50 lb  









 Heart Rate  87 /min  

 

 BP Systolic  140 mmHg  

 

 BP Diastolic  100 mmHg  

 

 BP Systolic Sitting  120 mmHg  

 

 BP Diastolic Sitting  82 mmHg  

 

 BP Systolic Standing  120 mmHg  whoozy

 

 BP Diastolic Standing  80 mmHg  whoozy

 

 BP Systolic Lying Down  140 mmHg  

 

 BP Diastolic Lying Down  100 mmHg  

 

 O2 % BldC Oximetry  97 %  









 10/18/2017 11:58am  Weight  218.00 lb  









 Heart Rate  68 /min  

 

 BP Systolic Sitting  128 mmHg  

 

 BP Diastolic Sitting  82 mmHg  

 

 O2 % BldC Oximetry  98 %  









 2017 10:11am  Weight  223.00 lb  









 Heart Rate  58 /min  

 

 BP Systolic Sitting  118 mmHg  

 

 BP Diastolic Sitting  88 mmHg  

 

 O2 % BldC Oximetry  98 %  









 2017 12:56pm  Weight  216.00 lb  









 Heart Rate  54 /min  

 

 BP Systolic Sitting  122 mmHg  

 

 BP Diastolic Sitting  80 mmHg  

 

 O2 % BldC Oximetry  97 %  









 2017  9:53am  Weight  219.00 lb  









 Heart Rate  63 /min  

 

 BP Systolic  128 mmHg  

 

 BP Diastolic  72 mmHg  

 

 Body Temperature  98.4 F  

 

 O2 % BldC Oximetry  97 %  









 2016  8:51am  Weight  219.00 lb  









 Heart Rate  61 /min  

 

 BP Systolic Sitting  142 mmHg  

 

 BP Diastolic Sitting  76 mmHg  

 

 Body Temperature  96.6 F  

 

 O2 % BldC Oximetry  96 %  









 2016  2:57pm  Height  67 inches  5'7"









 Weight  219.75 lb  

 

 Heart Rate  61 /min  

 

 BP Systolic  130 mmHg  

 

 BP Diastolic  80 mmHg  

 

 Body Temperature  96.9 F  

 

 O2 % BldC Oximetry  98 %  

 

 BMI (Body Mass Index)  34.4 kg/m2  









 2016  2:13pm  Height  68.5 inches  5'8.50"









 Weight  222.00 lb  

 

 Heart Rate  64 /min  

 

 BP Systolic Sitting  128 mmHg  

 

 BP Diastolic Sitting  84 mmHg  

 

 Respiratory Rate  14 /min  

 

 Body Temperature  98.4 F  

 

 O2 % BldC Oximetry  97 %  

 

 BMI (Body Mass Index)  33.3 kg/m2  









 02/10/2016 10:15am  Height  68.5 inches  5'8.50"









 Weight  223.00 lb  

 

 Heart Rate  84 /min  

 

 BP Systolic Sitting  128 mmHg  

 

 BP Diastolic Sitting  82 mmHg  

 

 Respiratory Rate  14 /min  

 

 Body Temperature  98.2 F  

 

 O2 % BldC Oximetry  98 %  

 

 BMI (Body Mass Index)  33.4 kg/m2  









 2015  2:49pm  Height  68.5 inches  5'8.50"









 Weight  223.00 lb  

 

 Heart Rate  78 /min  

 

 BP Systolic Sitting  128 mmHg  

 

 BP Diastolic Sitting  84 mmHg  

 

 Respiratory Rate  14 /min  

 

 Body Temperature  98.0 F  

 

 O2 % BldC Oximetry  98 %  

 

 BMI (Body Mass Index)  33.4 kg/m2  









 2015 11:25am  Height  68.5 inches  5'8.50"









 Weight  218.00 lb  

 

 Heart Rate  54 /min  

 

 BP Systolic Sitting  145 mmHg  

 

 BP Diastolic Sitting  56 mmHg  

 

 Body Temperature  96.4 F  

 

 BMI (Body Mass Index)  32.7 kg/m2  









 2015  2:47pm  Height  68.5 inches  5'8.50"









 Weight  219.25 lb  

 

 Heart Rate  60 /min  

 

 BP Systolic Sitting  128 mmHg  

 

 BP Diastolic Sitting  76 mmHg  

 

 Body Temperature  96.7 F  

 

 O2 % BldC Oximetry  97 %  

 

 BMI (Body Mass Index)  32.8 kg/m2  









 2015  2:26pm  Height  68.5 inches  5'8.50"









 Weight  216.00 lb  

 

 Heart Rate  61 /min  

 

 BP Systolic  155 mmHg  

 

 BP Diastolic  55 mmHg  

 

 Body Temperature  97.8 F  

 

 O2 % BldC Oximetry  96 %  

 

 BMI (Body Mass Index)  32.4 kg/m2  









 2015 10:18am  Height  68.5 inches  5'8.50"









 Weight  216.00 lb  

 

 Heart Rate  64 /min  

 

 BP Systolic  132 mmHg  

 

 BP Diastolic  47 mmHg  

 

 Body Temperature  97.7 F  

 

 O2 % BldC Oximetry  97 %  

 

 BMI (Body Mass Index)  32.4 kg/m2  









 2015  2:47pm  Height  68.5 inches  5'8.50"









 Weight  221.00 lb  

 

 Heart Rate  62 /min  

 

 BP Systolic Sitting  118 mmHg  

 

 BP Diastolic Sitting  78 mmHg  

 

 O2 % BldC Oximetry  98 %  

 

 BMI (Body Mass Index)  33.1 kg/m2  









 2014  3:07pm  Height  68.5 inches  5'8.50"









 Weight  219.25 lb  

 

 Heart Rate  63 /min  

 

 BP Systolic Sitting  128 mmHg  

 

 BP Diastolic Sitting  70 mmHg  

 

 O2 % BldC Oximetry  97 %  

 

 BMI (Body Mass Index)  32.8 kg/m2  









 2014 10:46am  Height  68.5 inches  5'8.50"









 Weight  220.50 lb  

 

 Heart Rate  68 /min  

 

 BP Systolic Sitting  122 mmHg  

 

 BP Diastolic Sitting  60 mmHg  

 

 BMI (Body Mass Index)  33.0 kg/m2  









 2014  1:48pm  Height  68.5 inches  5'8.50"









 Weight  217.00 lb  

 

 Heart Rate  70 /min  

 

 BP Systolic Sitting  128 mmHg  

 

 BP Diastolic Sitting  72 mmHg  

 

 Body Temperature  97.2 F  

 

 BMI (Body Mass Index)  32.5 kg/m2  









 2014  2:27pm  Weight  218.00 lb  









 Heart Rate  66 /min  

 

 BP Systolic Sitting  134 mmHg  

 

 BP Diastolic Sitting  80 mmHg  









 2014  2:50pm  Weight  223.50 lb  









 Heart Rate  72 /min  

 

 BP Systolic  120 mmHg  

 

 BP Diastolic  62 mmHg  









 10/11/2013  2:45pm  Weight  226.75 lb  









 Heart Rate  48 /min  

 

 BP Systolic  140 mmHg  

 

 BP Diastolic  72 mmHg  









 2013 12:41pm  Height  68.5 inches  5'8.50"









 Weight  226.00 lb  

 

 Heart Rate  64 /min  

 

 BP Systolic Sitting  112 mmHg  

 

 BP Diastolic Sitting  66 mmHg  

 

 BMI (Body Mass Index)  33.9 kg/m2  









 2013  2:51pm  Height  67.5 inches  5'7.50"









 Weight  224.00 lb  

 

 Heart Rate  64 /min  

 

 BP Systolic Sitting  120 mmHg  could not hear BP

 

 BP Diastolic Sitting  60 mmHg  could not hear BP

 

 BMI (Body Mass Index)  34.6 kg/m2  









 2013 10:57am  Weight  216.00 lb  









 Heart Rate  56 /min  

 

 BP Systolic Sitting  100 mmHg  

 

 BP Diastolic Sitting  60 mmHg  

 

 Body Temperature  96.0 F  









 03/15/2013  2:47pm  Height  68 inches  5'8"









 Weight  222.00 lb  

 

 Heart Rate  46 /min  difficult to feel pulse and hear apical

 

 BP Systolic Sitting  136 mmHg  

 

 BP Diastolic Sitting  64 mmHg  

 

 BMI (Body Mass Index)  33.8 kg/m2  









 2012  3:24pm  Height  68 inches  5'8"









 Weight  218.00 lb  

 

 Heart Rate  68 /min  

 

 BP Systolic Sitting  126 mmHg  

 

 BP Diastolic Sitting  76 mmHg  

 

 BMI (Body Mass Index)  33.1 kg/m2  









 2012 10:17am  Height  68 inches  5'8"









 Weight  225.00 lb  

 

 Heart Rate  82 /min  

 

 BP Systolic Sitting  130 mmHg  

 

 BP Diastolic Sitting  69 mmHg  

 

 BMI (Body Mass Index)  34.2 kg/m2  









 10/05/2012  4:03pm  Height  68 inches  5'8"









 Weight  225.00 lb  

 

 Heart Rate  60 /min  

 

 BP Systolic Sitting  122 mmHg  

 

 BP Diastolic Sitting  70 mmHg  

 

 BMI (Body Mass Index)  34.2 kg/m2  









 2012  4:02pm  Height  68 inches  5'8"









 Weight  224.00 lb  

 

 Heart Rate  60 /min  

 

 BP Systolic Sitting  132 mmHg  

 

 BP Diastolic Sitting  80 mmHg  

 

 BMI (Body Mass Index)  34.1 kg/m2  









 2012  4:44pm  Height  68 inches  5'8"









 Weight  222.50 lb  

 

 Heart Rate  56 /min  

 

 BP Systolic Sitting  128 mmHg  

 

 BP Diastolic Sitting  70 mmHg  

 

 BMI (Body Mass Index)  33.8 kg/m2  









 2012  3:25pm  Height  68 inches  5'8"









 Weight  226.00 lb  

 

 Heart Rate  66 /min  

 

 BP Systolic Sitting  132 mmHg  

 

 BP Diastolic Sitting  78 mmHg  

 

 BMI (Body Mass Index)  34.4 kg/m2  







Results







 Test  Date  Facility  Test  Result  H/L  Range  Note

 

 CBC Auto Diff  2019    White Blood  14.2 10^3/uL  
High  3.5-10.8  



     [address]  Count        



     [place] [address] [phone]          









 Red Blood Count  3.25 10^6/uL  Low  4.18-5.48  

 

 Hemoglobin  9.8 g/dL  Low  14.0-18.0  

 

 Hematocrit  29 %  Low  42-52  

 

 Mean Corpuscular Volume  90 fL  N  80-94  

 

 Mean Corpuscular Hemoglobin  30 pg  N  27-31  

 

 Mean Corpuscular HGB Conc  33 g/dL  N  31-36  

 

 Red Cell Distribution Width  13 %  N  10.5-15  

 

 Platelet Count  234 10^3/uL  N  150-450  

 

 Mean Platelet Volume  7.1 fL  Low  7.4-10.4  

 

 Abs Neutrophils  12.8 10^3/uL  High  1.5-7.7  

 

 Abs Lymphocytes  0.6 10^3/uL  Low  1.0-4.8  

 

 Abs Monocytes  0.8 10^3/uL  N  0-0.8  

 

 Abs Eosinophils  0.0 10^3/uL  N  0-0.6  

 

 Abs Basophils  0.0 10^3/uL  N  0-0.2  

 

 Abs Nucleated RBC  0.0 10^3/uL      

 

 Granulocyte %  90.1 %      

 

 Lymphocyte %  3.9 %      

 

 Monocyte %  5.7 %      

 

 Eosinophil %  0.1 %      

 

 Basophil %  0.2 %      

 

 Nucleated Red Blood Cells %  0.0      









 Laboratory test  2019    Lactic Acid  1.1 mmol/L  N
  0.5-2.0  1



 finding    [address] [place] [address] [phone]          

 

 Comp Metabolic  2019    Sodium  136 mmol/L  N  135-
145  



 Panel    [address] [place] [address] [phone]          









 Potassium  3.6 mmol/L  N  3.5-5.0  

 

 Chloride  104 mmol/L  N  101-111  

 

 Co2 Carbon Dioxide  23 mmol/L  N  22-32  

 

 Anion Gap  9 mmol/L  N  2-11  

 

 Glucose  219 mg/dL  High    

 

 Blood Urea Nitrogen  33 mg/dL  High  6-24  

 

 Creatinine  1.08 mg/dL  N  0.67-1.17  

 

 BUN/Creatinine Ratio  30.6  High  8-20  

 

 Calcium  8.3 mg/dL  Low  8.6-10.3  

 

 Total Protein  5.6 g/dL  Low  6.4-8.9  

 

 Albumin  3.2 g/dL  N  3.2-5.2  

 

 Globulin  2.4 g/dL  N  2-4  

 

 Albumin/Globulin Ratio  1.3  N  1-3  

 

 Total Bilirubin  0.30 mg/dL  N  0.2-1.0  

 

 Alkaline Phosphatase  49 U/L  N    

 

 Alt  7 U/L  N  7-52  

 

 Ast  11 U/L  Low  13-39  

 

 Egfr Non-  65.0    >60  

 

 Egfr   78.6    >60  2









 Laboratory test  2019    Magnesium  1.8 mg/dL  Low  
1.9-2.7  



 finding    [address]  [place] [address] [phone]          









 Troponin-I (TnI)  0.00 ng/mL    <0.04  3

 

 TSH (Thyroid Stim Horm)  4.21 mcIU/mL  N  0.34-5.60  









 Urinalysis Profile  2019    Urine Color  Yellow    
  



     [address] [place] [address] [phone]          









 Urine Appearance  Clear      

 

 Urine Specific Gravity  1.015  N  1.010-1.030  

 

 Urine pH  5.0  N  5-9  

 

 Urine Urobilinogen  Negative    Negative  

 

 Urine Ketones  Negative    Negative  

 

 Urine Protein  Negative    Negative  

 

 Urine Leukocytes  Negative    Negative  

 

 Urine Blood  Negative    Negative  

 

 Urine Nitrite  Negative    Negative  

 

 Urine Bilirubin  Negative    Negative  

 

 Urine Glucose  1+(50 mg/dL)  Abnormal  Negative  









 Iron & Iron Binding  2019    Iron  28 g/dL  Low  
  



 Capacity    [address] [place] [address] [phone]          









 Unsaturated Iron Binding  < 255 g/dL      

 

 Total Iron Binding Capacity  270 g/dL  N  250-450  

 

 Transferrin  193 mg/dL  Low  203-362  

 

 % Iron Saturation  10 %  Low  15-55  









 Comp Metabolic Panel  2017    Sodium  125 mmol/L  
Low  133-145  



     [address] [place] [address] [phone]          









 Potassium  4.6 mmol/L  N  3.5-5.0  

 

 Chloride  91 mmol/L  Low  101-111  

 

 Co2 Carbon Dioxide  26 mmol/L  N  22-32  

 

 Anion Gap  8 mmol/L  N  2-11  

 

 Glucose  241 mg/dL  High    

 

 Blood Urea Nitrogen  40 mg/dL  High  6-24  

 

 Creatinine  1.51 mg/dL  High  0.67-1.17  

 

 BUN/Creatinine Ratio  26.5  High  8-20  

 

 Calcium  9.2 mg/dL  N  8.6-10.3  

 

 Total Protein  7.0 g/dL  N  6.4-8.9  

 

 Albumin  4.0 g/dL  N  3.2-5.2  

 

 Globulin  3.0 g/dL  N  2-4  

 

 Albumin/Globulin Ratio  1.3  N  1-3  

 

 Total Bilirubin  0.50 mg/dL  N  0.2-1.0  

 

 Alkaline Phosphatase  67 U/L  N    

 

 Alt  14 U/L  N  7-52  

 

 Ast  20 U/L  N  13-39  

 

 Egfr Non-  44.4    >60  

 

 Egfr   57.0    >60  4









 Laboratory test  2017    Magnesium  1.4 mg/dL  Low  
1.9-2.7  



 finding    [address]          



     [place] [address] [phone]          

 

 CBC Auto Diff  2017    White Blood  9.1  N  3.5-
10.8  



     [address]  Count  10^3/uL      



     [place], [place] [id number] [phone]          









 Red Blood Count  4.62 10^6/uL  N  4.0-5.4  

 

 Hemoglobin  13.4 g/dL  Low  14.0-18.0  

 

 Hematocrit  40 %  Low  42-52  

 

 Mean Corpuscular Volume  87 fL  N  80-94  

 

 Mean Corpuscular Hemoglobin  29 pg  N  27-31  

 

 Mean Corpuscular HGB Conc  33 g/dL  N  31-36  

 

 Red Cell Distribution Width  14 %  N  10.5-15  

 

 Platelet Count  266 10^3/uL  N  150-450  

 

 Mean Platelet Volume  7 um3  Low  7.4-10.4  

 

 Abs Neutrophils  7.6 10^3/uL  N  1.5-7.7  

 

 Abs Lymphocytes  0.5 10^3/uL  Low  1.0-4.8  

 

 Abs Monocytes  0.9 10^3/uL  High  0-0.8  

 

 Abs Eosinophils  0 10^3/uL  N  0-0.6  

 

 Abs Basophils  0.1 10^3/uL  N  0-0.2  

 

 Abs Nucleated RBC  0.01 10^3/uL      

 

 Granulocyte %  83.6 %  High  38-83  

 

 Lymphocyte %  5.9 %  Low  25-47  

 

 Monocyte %  9.8 %  High  1-9  

 

 Eosinophil %  0 %  N  0-6  

 

 Basophil %  0.7 %  N  0-2  

 

 Nucleated Red Blood Cells %  0.1      









 Laboratory test  2017    Hemoglobin A1c  8.5 %  
High  4.0-5.6  5



 finding    [address]  (Glyco HGB)        



     [place] [address] [phone]          

 

 CBC Auto Diff  2017    White Blood  9.6  N  3.5-
10.8  



     [address]  Count  10^3/uL      



     [place], [place] [id number] [phone]          









 Red Blood Count  4.21 10^6/uL  N  4.0-5.4  

 

 Hemoglobin  12.4 g/dL  Low  14.0-18.0  

 

 Hematocrit  36 %  Low  42-52  

 

 Mean Corpuscular Volume  86 fL  N  80-94  

 

 Mean Corpuscular Hemoglobin  29 pg  N  27-31  

 

 Mean Corpuscular HGB Conc  34 g/dL  N  31-36  

 

 Red Cell Distribution Width  14 %  N  10.5-15  

 

 Platelet Count  246 10^3/uL  N  150-450  

 

 Mean Platelet Volume  7 um3  Low  7.4-10.4  

 

 Abs Neutrophils  8.5 10^3/uL  High  1.5-7.7  

 

 Abs Lymphocytes  0.3 10^3/uL  Low  1.0-4.8  

 

 Abs Monocytes  0.7 10^3/uL  N  0-0.8  

 

 Abs Eosinophils  0 10^3/uL  N  0-0.6  

 

 Abs Basophils  0.1 10^3/uL  N  0-0.2  

 

 Abs Nucleated RBC  0 10^3/uL      

 

 Granulocyte %  88.4 %  High  38-83  

 

 Lymphocyte %  3.0 %  Low  25-47  

 

 Monocyte %  7.7 %  N  1-9  

 

 Eosinophil %  0 %  N  0-6  

 

 Basophil %  0.9 %  N  0-2  

 

 Nucleated Red Blood Cells %  0      









 Laboratory test  2017    Troponin-I  0.07 ng/mL  
High  <0.04  6



 finding    [address]  (TnI)        



     [place] [address] [phone]          

 

 CKMB  2017    CKMB  ng/mL  6.7 ng/mL  High  0.6-6.3
  



     [address]           



     [place] [address] [phone]          

 

 Laboratory test  2017    Magnesium  1.3 mg/dL  Low  
1.9-2.7  



 finding    [address]          



     [place] [address] [phone]          









 Creatine Kinase(CK)  461 U/L  High    









 Comp Metabolic Panel  2017    Sodium  120 mmol/L  
Low  133-145  



     [address]          



     [place] [address] [phone]          









 Potassium  4.1 mmol/L  N  3.5-5.0  

 

 Chloride  91 mmol/L  Low  101-111  

 

 Co2 Carbon Dioxide  22 mmol/L  N  22-32  

 

 Anion Gap  7 mmol/L  N  2-11  

 

 Glucose  369 mg/dL  High    

 

 Blood Urea Nitrogen  43 mg/dL  High  6-24  

 

 Creatinine  1.45 mg/dL  High  0.67-1.17  

 

 BUN/Creatinine Ratio  29.7  High  8-20  

 

 Calcium  8.4 mg/dL  Low  8.6-10.3  

 

 Total Protein  6.8 g/dL  N  6.4-8.9  

 

 Albumin  3.5 g/dL  N  3.2-5.2  

 

 Globulin  3.3 g/dL  N  2-4  

 

 Albumin/Globulin Ratio  1.1  N  1-3  

 

 Total Bilirubin  0.50 mg/dL  N  0.2-1.0  

 

 Alkaline Phosphatase  57 U/L  N    

 

 Alt  15 U/L  N  7-52  

 

 Ast  25 U/L  N  13-39  

 

 Egfr Non-  46.5    >60  

 

 Egfr   59.8    >60  7









 Laboratory test  10/16/2017    Point of Care  310 mg/dL  
High    8



 finding    [address]  [place] [address] [phone]          

 

 Urinalysis  10/16/2017    Urine Color  Straw  N    



 Profile    [address]  [place] [address] [phone]          









 Urine Appearance  Clear  N    

 

 Urine Specific Gravity  1.009  Low  1.010-1.030  

 

 Urine pH  5.0  N  5-9  

 

 Urine Urobilinogen  Negative  N  Negative  

 

 Urine Ketones  Negative  N  Negative  

 

 Urine Protein  Negative  N  Negative  

 

 Urine Leukocytes  Negative  N  Negative  

 

 Urine Blood  1+  Abnormal  Negative  

 

 Urine Nitrite  Negative  N  Negative  

 

 Urine Bilirubin  Negative  N  Negative  

 

 Urine Glucose  3+(>=500 mg/dL)  Abnormal  Negative  

 

 Urine White Blood Cell  Trace(0-5/hpf)  N  Absent  

 

 Urine Red Blood Cell  Trace(0-2/hpf)  N  Absent  

 

 Urine Bacteria  Absent  N  Absent  









 Laboratory test  10/16/2017    Point of Care  250 mg/dL  
High    9



 finding    [address]  [place] [address] [phone]          

 

 Laboratory test  10/16/2017    Point of Care  75 mg/dL  N
    10



 finding    [address]  Glucose        



     [place] [address] [phone]          

 

 Laboratory test  10/16/2017    Magnesium  1.7 mg/dL  Low  
1.9-2.7  



 finding    [address]          



     [place] [address] [phone]          









 Lipase  24 U/L  N  11.0-82.0  

 

 C Reactive Protein  12.68 mg/L  High  < 5.00  11

 

 TSH (Thyroid Stim Horm)  5.03 mcIU/mL  N  0.34-5.60  

 

 B-Type Natriuretic Peptide BNP  39 pg/mL  N    12









 CBC Auto  10/16/2017    White Blood  11.7 10^3/uL  High  
3.5-10.8  



 Diff    [address]  Count        



     [place] [address] [phone]          









 Red Blood Count  4.39 10^6/uL  N  4.0-5.4  

 

 Hemoglobin  12.9 g/dL  Low  14.0-18.0  

 

 Hematocrit  38 %  Low  42-52  

 

 Mean Corpuscular Volume  87 fL  N  80-94  

 

 Mean Corpuscular Hemoglobin  30 pg  N  27-31  

 

 Mean Corpuscular HGB Conc  34 g/dL  N  31-36  

 

 Red Cell Distribution Width  14 %  N  10.5-15  

 

 Platelet Count  340 10^3/uL  N  150-450  

 

 Mean Platelet Volume  7 um3  Low  7.4-10.4  

 

 Abs Neutrophils  8.1 10^3/uL  High  1.5-7.7  

 

 Abs Lymphocytes  2.2 10^3/uL  N  1.0-4.8  

 

 Abs Monocytes  1.2 10^3/uL  High  0-0.8  

 

 Abs Eosinophils  0.1 10^3/uL  N  0-0.6  

 

 Abs Basophils  0.1 10^3/uL  N  0-0.2  

 

 Abs Nucleated RBC  0.01 10^3/uL  N    

 

 Granulocyte %  69.6 %  N  38-83  

 

 Lymphocyte %  18.9 %  Low  25-47  

 

 Monocyte %  10.0 %  High  1-9  

 

 Eosinophil %  0.9 %  N  0-6  

 

 Basophil %  0.6 %  N  0-2  

 

 Nucleated Red Blood Cells %  0.1  N    









 Laboratory test  10/16/2017    Lactic Acid  2.1 mmol/L  
High  0.5-2.0  13



 finding    [address]          



     [place] [address] [phone]          

 

 Inr/Protime  10/16/2017    Inr  0.93  N  0.89-1.11  



     [address]          



     [place] [address] [phone]          

 

 Laboratory test  10/16/2017    Partial  28.1  N  26.0-
36.3  



 finding    [address]  Thrombo  seconds      



     [place] [address]  Time PTT        



     [phone]          

 

 Comp Metabolic  10/16/2017    Sodium  135 mmol/L  N  133-
145  



 Panel    [address]          



     [place] [address]          



     [phone]          









 Potassium  3.1 mmol/L  Low  3.5-5.0  

 

 Chloride  105 mmol/L  N  101-111  

 

 Co2 Carbon Dioxide  21 mmol/L  Low  22-32  

 

 Anion Gap  9 mmol/L  N  2-11  

 

 Glucose  42 mg/dL  Low    

 

 Blood Urea Nitrogen  34 mg/dL  High  6-24  

 

 Creatinine  1.43 mg/dL  High  0.67-1.17  

 

 BUN/Creatinine Ratio  23.8  High  8-20  

 

 Calcium  9.0 mg/dL  N  8.6-10.3  

 

 Total Protein  7.0 g/dL  N  6.4-8.9  

 

 Albumin  3.9 g/dL  N  3.2-5.2  

 

 Globulin  3.1 g/dL  N  2-4  

 

 Albumin/Globulin Ratio  1.3  N  1-3  

 

 Total Bilirubin  0.40 mg/dL  N  0.2-1.0  

 

 Alkaline Phosphatase  56 U/L  N    

 

 Alt  12 U/L  N  7-52  

 

 Ast  17 U/L  N  13-39  

 

 Egfr Non-  47.2  N  >60  

 

 Egfr   60.7  N  >60  14









 Lipid Profile  2017    Triglycerides  123 mg/dL  N 
   15



 (Trig/Chol/HDL)    [address]          



     [place] [address] [phone]          









 Cholesterol  171 mg/dL  N    16

 

 HDL Cholesterol  36.3 mg/dL  N    17

 

 LDL Cholesterol  110 mg/dL  N    18









 Comp Metabolic Panel  2017    Sodium  130 mmol/L  
Low  133-145  



     [address]          



     [place] [address] [phone]          









 Potassium  4.1 mmol/L  N  3.5-5.0  

 

 Chloride  99 mmol/L  Low  101-111  

 

 Co2 Carbon Dioxide  25 mmol/L  N  22-32  

 

 Anion Gap  6 mmol/L  N  2-11  

 

 Glucose  158 mg/dL  High    

 

 Blood Urea Nitrogen  29 mg/dL  High  6-24  

 

 Creatinine  1.36 mg/dL  High  0.67-1.17  

 

 BUN/Creatinine Ratio  21.3  High  8-20  

 

 Calcium  8.9 mg/dL  N  8.6-10.3  

 

 Total Protein  6.3 g/dL  Low  6.4-8.9  

 

 Albumin  3.7 g/dL  N  3.2-5.2  

 

 Globulin  2.6 g/dL  N  2-4  

 

 Albumin/Globulin Ratio  1.4  N  1-3  

 

 Total Bilirubin  0.40 mg/dL  N  0.2-1.0  

 

 Alkaline Phosphatase  63 U/L  N    

 

 Alt  7 U/L  N  7-52  

 

 Ast  12 U/L  Low  13-39  

 

 Egfr Non-  50.0  N  >60  

 

 Egfr   64.4  N  >60  19









 Laboratory test  2017    Hemoglobin A1c  8.7 %  
High  Less  20



 finding    [address]  (Glyco HGB)      than 6.0  



     [place] [address] [phone]          

 

 Laboratory test  2017  [facility]  Hemoglobin A1c  7.6  High  5-7  



 finding              

 

 Urine  2017    Urine  81.77  N    



 Microalbumin    [address]  Creatinine  mg/dL      



 [place] [address] [phone]          









 Ur Microalbumin (mg/L)  41.0 mg/L  N    

 

 Urine Microalbumin/Creatinine  50.1 ug/mg  High  <31  









 Laboratory test  2017  [facility]  Hemoglobin A1c  8.5  High  5-7  



 finding              

 

 Laboratory test  2016  [facility]  Hemoglobin A1c  8.0  High  5-7  



 finding              

 

 Lipid Profile  2016    Triglycerides  145 mg/dL  N 
   21, 22



 (Trig/Chol/HDL)    [address]          



     [place] [address] [phone]          









 Cholesterol  173 mg/dL  N    23

 

 HDL Cholesterol  37.2 mg/dL  N    24

 

 LDL Cholesterol  107 mg/dL  N    25









 Comp Metabolic Panel  2016    Sodium  137 mmol/L  N
  133-145  



     [address]          



     [place] [address] [phone]          









 Potassium  4.4 mmol/L  N  3.5-5.0  

 

 Chloride  102 mmol/L  N  101-111  

 

 Co2 Carbon Dioxide  27 mmol/L  N  22-32  

 

 Anion Gap  8 mmol/L  N  2-11  

 

 Glucose  131 mg/dL  High    

 

 Blood Urea Nitrogen  25 mg/dL  High  6-24  

 

 Creatinine  1.25 mg/dL  High  0.67-1.17  

 

 BUN/Creatinine Ratio  20.0  N  8-20  

 

 Calcium  9.6 mg/dL  N  8.6-10.3  

 

 Total Protein  6.9 g/dL  N  6.4-8.9  

 

 Albumin  4.1 g/dL  N  3.2-5.2  

 

 Globulin  2.8 g/dL  N  2-4  

 

 Albumin/Globulin Ratio  1.5  N  1-3  

 

 Total Bilirubin  0.50 mg/dL  N  0.2-1.0  

 

 Alkaline Phosphatase  57 U/L  N    

 

 Alt  11 U/L  N  7-52  

 

 Ast  16 U/L  N  13-39  

 

 Egfr Non-  55.3  N  >60  

 

 Egfr   71.1  N  >60  26









 Laboratory test  2016    Hemoglobin A1c  7.9 %  
High  Less  27



 finding    [address]  (Glyco HGB)      [place]  



     [place] [address]        6.0  



     [phone]          

 

 Laboratory test  2016  [facility]  Hemoglobin A1c  7.6  High  5-7  



 finding              

 

 Ua And Culture  2016    Urine Culture And  SEE      
28



 Sensitivity    [address]  Sensitivities  RESULT      



     [place] [address]    BELOW      



     [phone]          

 

 Urinalysis  2016    Urine Color  Yellow  N    



 Profile    [address]          



     [place] [address] [phone]          









 Urine Appearance  Cloudy  N    

 

 Urine Specific Gravity  1.012  N  1.010-1.030  

 

 Urine pH  5.0  N  5-9  

 

 Urine Urobilinogen  Negative  N  Negative  

 

 Urine Ketones  Negative  N  Negative  

 

 Urine Protein  Negative  N  Negative  

 

 Urine Leukocytes  Negative  N  Negative  

 

 Urine Blood  Negative  N  Negative  

 

 Urine Nitrite  Negative  N  Negative  

 

 Urine Bilirubin  Negative  N  Negative  

 

 Urine Glucose  Negative  N  Negative  









 Laboratory test  02/10/2016    Urine Culture And  SEE 
RESULT      29



 finding    [address]  Sensitivities  [place]      



     [place] [address]          



     [phone]          

 

 Ua Routine  02/10/2016  [facility]  Ua Specific Gravity  1.010      









 Ua PH  5      

 

 Ua Color  yellow      

 

 Ua Appera  clear      

 

 Ua WBC  neg      

 

 Ua Protein  neg      

 

 Ua Glucose  neg      

 

 Ua Ketones  neg      

 

 Ua Bilirubin  neg      

 

 Ua Urobilinogen  neg      

 

 Ua Nitrite  neg      

 

 Ua Occult Blood  small      









 Urine Microalbumin  2015    Ur Microalbumin  31.0 mg
/L  N    



 Random    [address]  (mg/L)        



     [place] [address]          



     [phone]          









 Urine Creatinine  36.54 mg/dL  N    

 

 Urine Microalbumin/Creatinine  84.8 ug/mg  High  <31  









 Laboratory test  2015  [facility]  Hemoglobin A1c  7.2  High  5-7  



 finding              

 

 Urine Microalbumin  2015    Ur Microalbumin  29.0 mg
/L  N    



 Random    [address]  (mg/L)        



     [place] [address] [phone]          









 Urine Creatinine  50.51 mg/dL  N    

 

 Urine Microalbumin/Creatinine  57.4 ug/mg  High  <31  









 Comp Metabolic Panel  2015    Sodium  136 mmol/L  N
  133-145  



     [address]          



     [place] [address] [phone]          









 Potassium  4.4 mmol/L  N  3.5-5.0  

 

 Chloride  100 mmol/L  Low  101-111  

 

 Co2 Carbon Dioxide  29 mmol/L  N  22-32  

 

 Anion Gap  7 mmol/L  N  2-11  

 

 Glucose  94 mg/dL  N    

 

 Blood Urea Nitrogen  20 mg/dL  N  6-24  

 

 Creatinine  1.20 mg/dL  High  0.67-1.17  

 

 BUN/Creatinine Ratio  16.7  N  8-20  

 

 Calcium  9.3 mg/dL  N  8.6-10.3  

 

 Total Protein  6.7 g/dL  N  6.4-8.9  

 

 Albumin  4.1 g/dL  N  3.2-5.2  

 

 Globulin  2.6 g/dL  N  2-4  

 

 Albumin/Globulin Ratio  1.6  N  1-3  

 

 Total Bilirubin  0.50 mg/dL  N  0.2-1.0  

 

 Alkaline Phosphatase  65 U/L  N    

 

 Alt  11 U/L  N  7-52  

 

 Ast  16 U/L  N  13-39  

 

 Egfr Non-  58.1  N  >60  

 

 Egfr   74.7  N  >60  30









 CBC Auto Diff  2015    White Blood  9.0 10^3/uL  N  
4.8-10.8  



     [address]  Count        



     [place] [address] [phone]          









 Red Blood Count  4.34 10^6/uL  N  4.0-5.4  

 

 Hemoglobin  13.1 g/dL  Low  14.0-18.0  

 

 Hematocrit  39 %  Low  42-52  

 

 Mean Corpuscular Volume  90 fL  N  80-94  

 

 Mean Corpuscular Hemoglobin  30 pg  N  27-31  

 

 Mean Corpuscular HGB Conc  34 g/dL  N  31-36  

 

 Red Cell Distribution Width  13 %  N  10.5-15  

 

 Platelet Count  308 10^3/uL  N  150-450  

 

 Mean Platelet Volume  7 um3  Low  7.4-10.4  

 

 Abs Neutrophils  6.3 10^3/uL  N  1.5-7.7  

 

 Abs Lymphocytes  1.8 10^3/uL  N  1.0-4.8  

 

 Abs Monocytes  0.8 10^3/uL  N  0-0.8  

 

 Abs Eosinophils  0.1 10^3/uL  N  0-0.6  

 

 Abs Basophils  0.1 10^3/uL  N  0-0.2  

 

 Abs Nucleated RBC  0 10^3/uL  N    

 

 Granulocyte %  69.5 %  N  38-83  

 

 Lymphocyte %  19.8 %  Low  25-47  

 

 Monocyte %  8.8 %  N  1-9  

 

 Eosinophil %  1.0 %  N  0-6  

 

 Basophil %  0.9 %  N  0-2  

 

 Nucleated Red Blood Cells %  0  N    









 Laboratory test  2015    Hemoglobin A1c  8.0 %  
High  Less  31



 finding    [address]  (Glyco HGB)      than 6.0  



     [place] [address] [phone]          

 

 Lipid Profile  2015    Triglycerides  116  N    32



 (Trig/Chol/HDL)    [address]    mg/dL      



     [place] [address] [phone]          









 Cholesterol  123 mg/dL  N    33

 

 HDL Cholesterol  39.6 mg/dL  N    34

 

 LDL Cholesterol  60 mg/dL  N    35









 Laboratory test  2015  [facility]  Hemoglobin A1c  7.4  High  5-7  



 finding              

 

 Basic Metabolic  2015    Sodium  131 mmol/L  Low  
133-145  



 Panel    [address]          



     [place] [address] [phone]          









 Potassium  3.8 mmol/L  N  3.5-5.0  

 

 Chloride  96 mmol/L  Low  101-111  

 

 Co2 Carbon Dioxide  29 mmol/L  N  22-32  

 

 Anion Gap  6 mmol/L  N  2-11  

 

 Glucose  132 mg/dL  High    

 

 Blood Urea Nitrogen  23 mg/dL  N  6-24  

 

 Creatinine  1.13 mg/dL  N  0.67-1.17  

 

 BUN/Creatinine Ratio  20.4  High  8-20  

 

 Calcium  9.3 mg/dL  N  8.6-10.3  

 

 Egfr Non-  62.4  N  >60  

 

 Egfr   80.3  N  >60  36









 Urine Microalbumin  2015    Ur Microalbumin  22.0 mg
/L  N    



 Random    [address]  (mg/L)        



     [place] [address] [phone]          









 Urine Creatinine  50.17 mg/dL  N    

 

 Urine Microalbumin/Creatinine  43.8  High  Less Than 31  









 Laboratory test  2015  [facility]  Hemoglobin A1c  8.1  High  5-7  



 finding              

 

 Laboratory test  2014  [facility]  Hemoglobin A1c  8.1  High  5-7  



 finding              

 

 Laboratory test  2014  [facility]  Hemoglobin A1c  8.4  High  5-7  



 finding              

 

 Laboratory test  2014  [facility]  Hemoglobin A1c  8.3  High  5-7  



 finding              

 

 Comp Metabolic  2014    Sodium  134 mmol/L    133-
145  



 Panel    [address]          



     [place] [address] [phone]          









 Potassium  4.2 mmol/L    3.5-5.0  

 

 Chloride  102 mmol/L    101-111  

 

 Co2 Carbon Dioxide  24.0 mmol/L    22-32  

 

 Anion Gap  8.0 mmol/L    2-11  

 

 Glucose  220 mg/dL  High    

 

 Blood Urea Nitrogen  17 mg/dL    6-24  

 

 Creatinine  1.20 mg/dL    0.50-1.40  

 

 BUN/Creatinine Ratio  14.2    8-20  

 

 Calcium  8.9 mg/dL    8.1-9.9  

 

 Total Protein  6.9 g/dL    6.2-8.1  

 

 Albumin  3.7 g/dL    3.2-5.2  

 

 Globulin  3.2 g/dL    2-4  

 

 Albumin/Globulin Ratio  1.2    1-3  

 

 Total Bilirubin  0.9 mg/dL    0.4-1.5  

 

 Alkaline Phosphatase  63 U/L      

 

 Alt  15 U/L    14-54  

 

 Ast  18 U/L    12-42  

 

 Egfr Non-  58.4    >60  

 

 Egfr   75.1    >60  37









 Lipid Profile  2014    Triglycerides  103 mg/dL    
  



 (Trig/Chol/HDL)    [address]          



     [place] [address] [phone]          









 Cholesterol  140 mg/dL    Less than 200  

 

 HDL Cholesterol  46 mg/dL    40-60  38

 

 Cholesterol/HDL Ratio  3.0 Average    1-4.44  

 

 LDL Cholesterol  73.4    Less Than 100  39









 Laboratory test  2014    TSH (Thyroid  3.25    0.34-
5.60  40



 finding    [address]  Stimulating  miu/mL      



     [place] [address]  Horm)        



     [phone]          









 Free T4  0.95 ng/mL    0.61-1.24  41

 

 Hemoglobin A1c  8.8 %  High  Less than 6.0  42









 CBC Auto Diff  2014    White Blood  7.8 10^3/uL    
4.8-10.8  



     [address]  Count        



     [place] [address] [phone]          









 Red Blood Count  4.22 10^6/uL    4.0-5.4  

 

 Hemoglobin  13.0 g/dL  Low  14.0-18.0  

 

 Hematocrit  38 %  Low  42-52  

 

 Mean Corpuscular Volume  90 fL    80-94  

 

 Mean Corpuscular Hemoglobin  31 pg    27-31  

 

 Mean Corpuscular HGB Conc  34 g/dL    31-36  

 

 Red Cell Distribution Width  14 %    10.5-15  

 

 Platelet Count  314 10^3/uL    150-450  

 

 Mean Platelet Volume  8 um3    7.4-10.4  

 

 Abs Neutrophils  5.5 10^3/uL    1.5-7.7  

 

 Abs Lymphocytes  1.5 10^3/uL    1.0-4.8  

 

 Abs Monocytes  0.7 10^3/uL    0-0.8  

 

 Abs Eosinophils  0.1 10^3/uL    0-0.6  

 

 Abs Basophils  0.1 10^3/uL    0-0.2  

 

 Abs Nucleated RBC  0 10^3/uL      

 

 Granulocyte %  69.8 %    38-83  

 

 Lymphocyte %  19.2 %  Low  25-47  

 

 Monocyte %  8.7 %    1-9  

 

 Eosinophil %  1.5 %    0-6  

 

 Basophil %  0.8 %    0-2  

 

 Nucleated Red Blood Cells %  0      









 Urine Microalbumin  2014    Ur Microalbumin  43.0 mg
/L      43



 Random    [address]  (mg/L)        



     [place] [address] [phone]          









 Urine Creatinine  72.4 mg/dL      

 

 Urine Microalbumin/Creatinine  59.4  High  Less Than 31  









 Laboratory test  10/11/2013  [facility]  Hemoglobin A1c  8.5  High  5-7  



 finding              

 

 Lipid Profile  10/03/2013    Triglycerides  133 mg/dL    
  



 (Trig/Chol/HDL)    [address]          



     [place] [address] [phone]          









 Cholesterol  202 mg/dL  High  Less than 200  

 

 HDL Cholesterol  46 mg/dL    40-60  44

 

 Cholesterol/HDL Ratio  4.4 Average    1-4.44  

 

 LDL Cholesterol  129.4  High  Less Than 100  45









 Laboratory test  2013  [facility]  Hemoglobin A1c  8.3  High  5-7  



 finding              

 

 Laboratory test  03/15/2013  [facility]  Hemoglobin A1c  8.7  High  5-7  



 finding              

 

 CBC Auto Diff  2012    White Blood Count  6.7    4.8
-10.8  



     [address]    10^3/uL      



     [place] [address] [phone]          









 Red Blood Count  4.01 10^6/uL    4.0-5.4  

 

 Hemoglobin  12.4 g/dL  Low  14.0-18.0  

 

 Hematocrit  37 %  Low  42-52  

 

 Mean Corpuscular Volume  92 fL    80-94  

 

 Mean Corpuscular Hemoglobin  31 pg    27-31  

 

 Mean Corpuscular HGB Conc  34 g/dL    31-36  

 

 Red Cell Distribution Width  13 %    10.5-15  

 

 Platelet Count  309 10^3/uL    150-450  

 

 Mean Platelet Volume  8 um3    7.4-10.4  

 

 Abs Neutrophils  4.7 10^3/uL    1.5-7.7  

 

 Abs Lymphocytes  1.3 10^3/uL    1.0-4.8  

 

 Abs Monocytes  0.6 10^3/uL    0-0.8  

 

 Abs Eosinophils  0.1 10^3/uL    0-0.6  

 

 Abs Basophils  0.1 10^3/uL    0-0.2  

 

 Abs Nucleated RBC  0 10^3/uL      

 

 Granulocyte %  69.8 %    38-83  

 

 Lymphocyte %  18.8 %  Low  25-47  

 

 Monocyte %  8.9 %    1-9  

 

 Eosinophil %  1.4 %    0-6  

 

 Basophil %  1.1 %    0-2  

 

 Nucleated Red Blood Cells %  0      









 Comp Metabolic Panel  2012    Sodium  132 mmol/L  
Low  133-145  



     [address]          



     [place] [address] [phone]          









 Potassium  4.7 mmol/L    3.5-5.0  

 

 Chloride  98 mmol/L  Low  101-111  

 

 Co2 Carbon Dioxide  26.0 mmol/L    22-32  

 

 Anion Gap  8.0 mmol/L    2-11  

 

 Glucose  147 mg/dL  High    

 

 Blood Urea Nitrogen  20 mg/dL    6-24  

 

 Creatinine  0.90 mg/dL    0.50-1.40  

 

 BUN/Creatinine Ratio  22.2  High  8-20  

 

 Calcium  9.1 mg/dL    8.1-9.9  

 

 Total Protein  6.1 GM/DL  Low  6.2-8.1  

 

 Albumin  3.5 GM/DL    3.2-5.2  

 

 Globulin  2.6 GM/DL    2-4  

 

 Albumin/Globulin Ratio  1.3    1-3  

 

 Total Bilirubin  0.7 mg/dL    0.1-1.0  46

 

 Alkaline Phosphatase  53 U/L      

 

 Alt  13 U/L  Low  14-54  

 

 Ast  16 U/L    12-42  

 

 Egfr Non-  81.6    >60  

 

 Egfr   105.0    >60  47









 Lipid Profile  2012    Triglycerides  90 mg/dL    40
-200  



 (Trig/Chol/HDL)    [address]          



     [place] [address] [phone]          









 Cholesterol  167 mg/dL    Less than 200  

 

 HDL Cholesterol  47 mg/dL    40-60  48

 

 Cholesterol/HDL Ratio  3.6 AVERAGE    1-4.44  

 

 LDL Cholesterol  102.0 mg/dL  High  Less Than 100  49









 Laboratory test  2012    Hemoglobin A1c  7.9 %  
High  Less  50



 finding    [address]        than 6.0  



     [place] [address] [phone]          

 

 Urine  2012    Microalbumin  50.0      



 Microalbumin    [address]  (MG/L)  mg/L      



 [place] [address] [phone]          









 Urine Creatinine  28.4 mg/dL      

 

 Mervin Alb/Creatinine Ratio  176.1 UG/MG  High  Less Than 30  51









 Basic Metabolic  2012    Sodium  134 mmol/L  Low  
135-145  



 Panel    [address]          



     [place] [address] [phone]          









 Potassium  5.4 mmol/L  High  3.5-5.0  

 

 Chloride  100 mmol/L  Low  101-111  

 

 Co2 (Carbon Dioxide)  27.0 mmol/L    22-32  

 

 Anion Gap  7.0 mmol/L    2-11  52

 

 Glucose  204 mg/dL  High    

 

 BUN  21 mg/dL    6-24  

 

 Creatinine  1.1 mg/dL    0.50-1.40  

 

 One Over Creatinine  0.90      

 

 BUN/Creatinine Ratio  19.1    8-20  

 

 Calcium  9.5 mg/dL    8.1-9.9  

 

 eGFR Non-  64.7    > 60  

 

 eGFR   83.3    > 60  53









 CBC Auto Diff  2012    White Blood  7.1 CUMM    4.8-
10.8  



     [address]  Count        



     [place] [address] [phone]          









 Red Cell Count  4.17 CUMM  Low  4.6-6.2  

 

 Hemoglobin  13.1 g/dL  Low  14.0-18.0  

 

 Hematocrit  38 %  Low  42-52  

 

 Mean Corpuscular Volume  91 um3    80-94  

 

 Mean Corpuscular Hemoglob  31 pg    27-31  

 

 Mean Corpuscular HGB Cone  35 g/dL    32-36  

 

 Redcell Distribution WDTH  14 %    10.5-15  

 

 Platelet Count  298 CUMM    150-450  

 

 Mean Platelet Volume  7.4 um3    7.4-10.4  

 

 Gran %  66.0 %    38-83  

 

 Lymph %  21.8 %    20-45  

 

 Mononuclear %  10.3 %  High  1-9  

 

 Eosinophil %  1.4 %    0-6  

 

 Basophil %  0.5 %    0-2  

 

 Abs Lymphs  1.6    1.0-4.8  

 

 Abs Mononuclear  0.7    0-0.8  

 

 Absolute Neutrophil Count  4.7    1.5-7.7  

 

 Abs Eosinophils  0.1    0-0.6  

 

 Abs Basophils  0    0-0.2  









 Laboratory test  2012    Hemoglobin A1c  8.5 %  
High  Less Than  54



 finding    [address]        6.0  



     [place] [address] [phone]          









 1  [facility] Severe Sepsis and Septic Shock Management Bundle Measure



   requires all lactic acids initially measuring >2.0 mmol/L be



   repeated.

 

 2  *******Because ethnic data is not always readily available,



   this report includes an eGFR for both -Americans and



   non- Americans.****



   The National Kidney Disease Education Program (NKDEP) does



   not endorse the use of the MDRD equation for patients that



   are not between the ages of 18 and 70, are pregnant, have



   extremes of body size, muscle mass, or nutritional status,



   or are non- or non-.



   According to the National Kidney Foundation, irrespective of



   diagnosis, the stage of the disease is based on the level of



   kidney function:



   Stage Description                      GFR(mL/min/1.73 m(2))



   1     Kidney damage with normal or decreased GFR       90



   2     Kidney damage with mild decrease in GFR          60-89



   3     Moderate decrease in GFR                         30-59



   4     Severe decrease in GFR                           15-29



   5     Kidney failure                       <15 (or dialysis)

 

 3  Troponin-I testing on Plasma Separator Tubes (PST) has a



   known false positive rate of 0.20-0.40%.  All positive



   troponins reflex immediately to secondary confirmatory



   testing.



   



   Using the [employer] DxI 800 Access Immunoassay systems, the



   99th percentile upper reference limit was demonstrated to be



   < 0.03 ng/mL.

 

 4  *******Because ethnic data is not always readily available,



   this report includes an eGFR for both -Americans and



   non- Americans.****



   The National Kidney Disease Education Program (NKDEP) does



   not endorse the use of the MDRD equation for patients that



   are not between the ages of 18 and 70, are pregnant, have



   extremes of body size, muscle mass, or nutritional status,



   or are non- or non-.



   According to the National Kidney Foundation, irrespective of



   diagnosis, the stage of the disease is based on the level of



   kidney function:



   Stage Description                      GFR(mL/min/1.73 m(2))



   1     Kidney damage with normal or decreased GFR       90



   2     Kidney damage with mild decrease in GFR          60-89



   3     Moderate decrease in GFR                         30-59



   4     Severe decrease in GFR                           15-29



   5     Kidney failure                       <15 (or dialysis)

 

 5  Therapeutic target for the treatment of diabetes



   mellitus patients is <7% HBA1C, and in selective



   patients <6.0%.  Please refer to American Diabetes



   Association diabetic care guidelines for further



   information.

 

 6  Result TnIDx:0.07 Called to [id number] at: 21:02:38 by:[id number] Read back by:
[id number]

 

 7  *******Because ethnic data is not always readily available,



   this report includes an eGFR for both -Americans and



   non- Americans.****



   The National Kidney Disease Education Program (NKDEP) does



   not endorse the use of the MDRD equation for patients that



   are not between the ages of 18 and 70, are pregnant, have



   extremes of body size, muscle mass, or nutritional status,



   or are non- or non-.



   According to the National Kidney Foundation, irrespective of



   diagnosis, the stage of the disease is based on the level of



   kidney function:



   Stage Description                      GFR(mL/min/1.73 m(2))



   1     Kidney damage with normal or decreased GFR       90



   2     Kidney damage with mild decrease in GFR          60-89



   3     Moderate decrease in GFR                         30-59



   4     Severe decrease in GFR                           15-29



   5     Kidney failure                       <15 (or dialysis)

 

 8  : PXA1250

 

 9  : AVX430

 

 10  : OUD4670

 

 11  Acute inflammation:  >10.00

 

 12  >100 to <200 pg/mL: likely compensated congestive heart



   failure (CHF)



   200 to 400 pg/mL: likely moderate CHF



   >400 pg/mL: likely moderate to severe CHF

 

 13  Critical Result LACT:2.1 Called to CHANDAN at: 04:55:30 by:[id number] Read 
back



   by:CHANDAN



   [facility] Severe Sepsis and Septic Shock Management Bundle Measure



   requires all lactic acids initially measuring >2.0 mmol/L be



   repeated.

 

 14  *******Because ethnic data is not always readily available,



   this report includes an eGFR for both -Americans and



   non- Americans.****



   The National Kidney Disease Education Program (NKDEP) does



   not endorse the use of the MDRD equation for patients that



   are not between the ages of 18 and 70, are pregnant, have



   extremes of body size, muscle mass, or nutritional status,



   or are non- or non-.



   According to the National Kidney Foundation, irrespective of



   diagnosis, the stage of the disease is based on the level of



   kidney function:



   Stage Description                      GFR(mL/min/1.73 m(2))



   1     Kidney damage with normal or decreased GFR       90



   2     Kidney damage with mild decrease in GFR          60-89



   3     Moderate decrease in GFR                         30-59



   4     Severe decrease in GFR                           15-29



   5     Kidney failure                       <15 (or dialysis)

 

 15  Desirable <150



   Borderline high 150-199



   High 200-499



   Very High >500

 

 16  Desirable <200



   Borderline high 200-239



   High >239

 

 17  Low <40



   Desirable: 40-60



   High: >60

 

 18  Desirable: <100 mg/dL



   Near Optimal: 100-129 mg/dL



   Borderline High: 130-159 mg/dL



   High: 160-189 mg/dL



   Very High: >189 mg/dL

 

 19  *******Because ethnic data is not always readily available,



   this report includes an eGFR for both -Americans and



   non- Americans.****



   The National Kidney Disease Education Program (NKDEP) does



   not endorse the use of the MDRD equation for patients that



   are not between the ages of 18 and 70, are pregnant, have



   extremes of body size, muscle mass, or nutritional status,



   or are non- or non-.



   According to the National Kidney Foundation, irrespective of



   diagnosis, the stage of the disease is based on the level of



   kidney function:



   Stage Description                      GFR(mL/min/1.73 m(2))



   1     Kidney damage with normal or decreased GFR       90



   2     Kidney damage with mild decrease in GFR          60-89



   3     Moderate decrease in GFR                         30-59



   4     Severe decrease in GFR                           15-29



   5     Kidney failure                       <15 (or dialysis)

 

 20  Therapeutic target for the treatment of diabetes



   Mellitus patients is <7% HBA1C, and in selective



   patients <6.0%.Please refer to American Diabetes



   Association Diabetic care guidelines for further



   information.

 

 21  FASTING

 

 22  Desirable <150



   Borderline high 150-199



   High 200-499



   Very High >500

 

 23  Desirable <200



   Borderline high 200-239



   High >239

 

 24  Low <40



   Desirable: 40-60



   High: >60

 

 25  Desirable: <100 mg/dL



   Near Optimal: 100-129 mg/dL



   Borderline High: 130-159 mg/dL



   High: 160-189 mg/dL



   Very High: >189 mg/dL

 

 26  *******Because ethnic data is not always readily available,



   this report includes an eGFR for both -Americans and



   non- Americans.****



   The National Kidney Disease Education Program (NKDEP) does



   not endorse the use of the MDRD equation for patients that



   are not between the ages of 18 and 70, are pregnant, have



   extremes of body size, muscle mass, or nutritional status,



   or are non- or non-.



   According to the National Kidney Foundation, irrespective of



   diagnosis, the stage of the disease is based on the level of



   kidney function:



   Stage Description                      GFR(mL/min/1.73 m(2))



   1     Kidney damage with normal or decreased GFR       90



   2     Kidney damage with mild decrease in GFR          60-89



   3     Moderate decrease in GFR                         30-59



   4     Severe decrease in GFR                           15-29



   5     Kidney failure                       <15 (or dialysis)

 

 27  Therapeutic target for the treatment of diabetes



   Mellitus patients is <7% HBA1C, and in selective



   patients <6.0%.Please refer to American Diabetes



   Association Diabetic care guidelines for further



   information.

 

 28  SEE RESULT BELOW



   -----------------------------------------------------------------------------
---------------



   Name:  ISIDRO CEDEÑO               : 1934    Attend Dr: Molina Acuña NP



   Acct:  [id number]  Unit: [id number]  AGE: 81            Location:  [facility]



   Re16                        SEX: M             Status:    REG REF



   -----------------------------------------------------------------------------
---------------



   



   SPEC: [id number]         CARYN:       [facility] DR: Molina Acuña NP



   REQ:  [id number]              RECD:   



   STATUS: COMP



   _



   SOURCE: URINE          SPDESC:



   ORDERED:  Urine Culture



   



   -----------------------------------------------------------------------------
---------------



   Procedure                         Result                         Reported   
        Site



   -----------------------------------------------------------------------------
---------------



   Urine Culture  Final                                             16-
1333      ML



   No Growth (<1,000 CFU/mL)



   



   -----------------------------------------------------------------------------
---------------



   * ML - [facility] ([place])



   .



   



   



   



   



   



   



   



   



   



   



   



   



   



   



   



   



   



   



   



   



   



   



   



   



   



   



   ** END OF REPORT **



   



   * ML=Testing performed at Main Lab



   DEPARTMENT OF PATHOLOGY,  [address] [place] [address]



   Phone # [phone]      Fax #[phone]



   Hal Higuera M.D. Director     [facility] # [id number]

 

 29  SEE RESULT BELOW



   -----------------------------------------------------------------------------
---------------



   Name:  ISIDRO CEDEÑO               : 1934    Attend Dr: Molina Acuña NP



   Acct:  [id number]  Unit: [id number]  AGE: 81            Location:  [facility]



   Re/10/16                        SEX: M             Status:    REG REF



   -----------------------------------------------------------------------------
---------------



   



   SPEC: [id number]         CARYN:   02/10/    [facility] DR: Molina Acuña NP



   REQ:  [id number]              RECD:   02/10/



   STATUS: COMP



   _



   SOURCE: URINE          SPDESC:



   ORDERED:  Urine Culture



   



   -----------------------------------------------------------------------------
---------------



   Procedure                         Result                         Reported   
        Site



   -----------------------------------------------------------------------------
---------------



   Urine Culture  Final                                             16-
1730      ML



   No Growth (<1,000 CFU/mL)



   



   -----------------------------------------------------------------------------
---------------



   * ML - MAIN LAB ([facility])



   .



   



   



   



   



   



   



   



   



   



   



   



   



   



   



   



   



   



   



   



   



   



   



   



   



   



   



   ** END OF REPORT **



   



   * ML=Testing performed at Main Lab



   DEPARTMENT OF PATHOLOGY,  [address] [place] [address]



   Phone # [phone]      Fax #[phone]



   Hal Higuera M.D. Director     [facility] # [id number]

 

 30  *******Because ethnic data is not always readily available,



   this report includes an eGFR for both -Americans and



   non- Americans.****



   The National Kidney Disease Education Program (NKDEP) does



   not endorse the use of the MDRD equation for patients that



   are not between the ages of 18 and 70, are pregnant, have



   extremes of body size, muscle mass, or nutritional status,



   or are non- or non-.



   According to the National Kidney Foundation, irrespective of



   diagnosis, the stage of the disease is based on the level of



   kidney function:



   Stage Description                      GFR(mL/min/1.73 m(2))



   1     Kidney damage with normal or decreased GFR       90



   2     Kidney damage with mild decrease in GFR          60-89



   3     Moderate decrease in GFR                         30-59



   4     Severe decrease in GFR                           15-29



   5     Kidney failure                       <15 (or dialysis)

 

 31  Therapeutic target for the treatment of diabetes



   Mellitus patients is <7% HBA1C, and in selective



   patients <6.0%.Please refer to American Diabetes



   Association Diabetic care guidelines for further



   information.

 

 32  Desirable <150



   Borderline high 150-199



   High 200-499



   Very High >500

 

 33  Desirable <200



   Borderline high 200-239



   High >239

 

 34  Low <40



   Desirable: 40-60



   High: >60

 

 35  Desirable: <100 mg/dL



   Near Optimal: 100-129 mg/dL



   Borderline High: 130-159 mg/dL



   High: 160-189 mg/dL



   Very High: >189 mg/dL

 

 36  *******Because ethnic data is not always readily available,



   this report includes an eGFR for both -Americans and



   non- Americans.****



   The National Kidney Disease Education Program (NKDEP) does



   not endorse the use of the MDRD equation for patients that



   are not between the ages of 18 and 70, are pregnant, have



   extremes of body size, muscle mass, or nutritional status,



   or are non- or non-.



   According to the National Kidney Foundation, irrespective of



   diagnosis, the stage of the disease is based on the level of



   kidney function:



   Stage Description                      GFR(mL/min/1.73 m(2))



   1     Kidney damage with normal or decreased GFR       90



   2     Kidney damage with mild decrease in GFR          60-89



   3     Moderate decrease in GFR                         30-59



   4     Severe decrease in GFR                           15-29



   5     Kidney failure                       <15 (or dialysis)

 

 37  *******Because ethnic data is not always readily available,



   this report includes an eGFR for both -Americans and



   non- Americans.****



   The National Kidney Disease Education Program (NKDEP) does



   not endorse the use of the MDRD equation for patients that



   are not between the ages of 18 and 70, are pregnant, have



   extremes of body size, muscle mass, or nutritional status,



   or are non- or non-.



   According to the National Kidney Foundation, irrespective of



   diagnosis, the stage of the disease is based on the level of



   kidney function:



   Stage Description                      GFR(mL/min/1.73 m(2))



   1     Kidney damage with normal or decreased GFR       90



   2     Kidney damage with mild decrease in GFR          60-89



   3     Moderate decrease in GFR                         30-59



   4     Severe decrease in GFR                           15-29



   5     Kidney failure                       <15 (or dialysis)

 

 38  HDL Interpretation:



   Undesirable: High Risk:  Less than 40 mg/dL



   Desirable:  Low Risk:  Greater than 60 mg/dL

 

 39  LDL Interpretation:



   Low Risk Optimal Level:  LDL Less than 100 mg/dL



   Near or Above Optimal:  -129 mg/dL



   Borderline High Risk:  -159 mg/dL



   High Risk:  -189 mg/dL



   Very High Risk:  LDL Greater than 189 mg/dL

 

 40  FASTING 10 HOUR

 

 41  FASTING 10 HOUR

 

 42  Therapeutic target for the treatment of diabetes



   Mellitus patients is <7% HBA1C, and in selective



   patients <6.0%.Please refer to American Diabetes



   Association Diabetic care guidelines for further



   information.

 

 43  Microalbuminuria in a random sample is defined as:



   Microalbumin/Creatinine ratio of  ug/mg.

 

 44  HDL Interpretation:



   Undesirable: High Risk:  Less than 40 mg/dL



   Desirable:  Low Risk:  Greater than 60 mg/dL

 

 45  LDL Interpretation:



   Low Risk Optimal Level:  LDL Less than 100 mg/dL



   Near or Above Optimal:  -129 mg/dL



   Borderline High Risk:  -159 mg/dL



   High Risk:  -189 mg/dL



   Very High Risk:  LDL Greater than 189 mg/dL

 

 46  A metabolite of Naproxen, O-desmethylnaproxen, has been



   shown to interfere with the Jendrassik-Sapphire method for



   measuring total bilirubin.  Samples from patients who have



   taken Naproxen have shown spurious elevation in total



   bilirubin levels.

 

 47  *******Because ethnic data is not always readily available,



   this report includes an eGFR for both -Americans and



   non- Americans.****



   The National Kidney Disease Education Program (NKDEP) does



   not endorse the use of the MDRD equation for patients that



   are not between the ages of 18 and 70, are pregnant, have



   extremes of body size, muscle mass, or nutritional status,



   or are non- or non-.



   According to the National Kidney Foundation, irrespective of



   diagnosis, the stage of the disease is based on the level of



   kidney function:



   Stage Description                      GFR(mL/min/1.73 m(2))



   1     Kidney damage with normal or decreased GFR       90



   2     Kidney damage with mild decrease in GFR          60-89



   3     Moderate decrease in GFR                         30-59



   4     Severe decrease in GFR                           15-29



   5     Kidney failure                       <15 (or dialysis)

 

 48  HDL Interpretation:



   Undesirable: High Risk:  Less than 40 MG/DL



   Desirable:  Low Risk:  Greater than 60 MG/DL

 

 49  LDL Interpretation:



   Low Risk Optimal Level:  LDL Less than 100 MG/DL



   Near or Above Optimal:  -129 MG/DL



   Borderline High Risk:  -159 MG/DL



   High Risk:  -189 MG/DL



   Very High Risk:  LDL Greater than 189 MG/DL

 

 50  Therapeutic target for the treatment of diabetes



   Mellitus patients is <7% HBA1C, and in selective



   patients <6.0%.Please refer to American Diabetes



   Association Diabetic care guidelines for further



   information.

 

 51  MICROALBUMINURIA IN A RANDOM SAMPLE IS DEFINED AS:



   MICROALBUMIN/CREATININE RATIO OF  ug/mg.



   .

 

 52  Anion gap measurement may be of limited value in the



   presence of any alkalosis, especially in a combined acid



   base disorder.



   .

 

 53  *******Because ethnic data is not always readily available,



   this report includes an eGFR for both -Americans and



   non- Americans.****



   The National Kidney Disease Education Program (NKDEP) does



   not endorse the use of the MDRD equation for patients that



   are not between the ages of 18 and 70, are pregnant, have



   extremes of body size, muscle mass, or nutritional status,



   or are non- or non-.



   According to the National Kidney Foundation, irrespective of



   diagnosis, the stage of the disease is based on the level of



   kidney function:



   Stage Description                      GFR(mL/min/1.73 m(2))



   1     Kidney damage with normal or decreased GFR       90



   2     Kidney damage with mild decrease in GFR          60-89



   3     Moderate decrease in GFR                         30-59



   4     Severe decrease in GFR                           15-29



   5     Kidney failure                       <15 (or dialysis)

 

 54  THERAPEUTIC TARGET FOR THE TREATMENT OF DIABETES



   MELLITUS PATIENTS IS <7% HBA1C, AND IN SELECTIVE



   PATIENTS <6.0%.  PLEASE REFER TO AMERICAN DIABETES



   ASSOCIATION DIABETIC CARE GUIDELINES FOR FURTHER



   INFORMATION.







Procedures







 Date  Code  Description  Status

 

 2019  71992  Icd Eval Sing,Dual,Multi Lead Remote Recpt Transm Tech  
Completed



     Rev Tech S  

 

 2019  71938  Pacemaker Check Remote Up To 90Days  Completed



     Single,Dual,Multiple Lead  

 

 2019  48327  Icd Eval Sing,Dual,Multi Lead Remote Recpt Transm Tech  
Completed



     Rev Tech S  

 

 2019  19114  Pacemaker Check Remote Up To 90Days  Completed



     Single,Dual,Multiple Lead  

 

 2019  99297  Pace Maker Eval W/Iterative Adjment Dual Lead  Completed

 

 2019  93851  Moderate Sedation Services; Same Phys Intl 15 Mins; PT  
Completed



     >=5 Years  

 

 2019  13322  Perm Pacemaker Av Sequential Atrial And Ventricular  
Completed

 

 2019  56073  ECHO Transthorasic Realtime 2D W Doppler & Color Flow  
Completed



     Hosp  

 

 2017  15550  EKG Tracing & Interpretation  Completed

 

 2017  [id number]  Diabetic Retinal Eye Exam  Completed

 

 2016  [id number]  Diabetic Retinal Eye Exam  Completed

 

 2016  [id number]  Diabetic Foot Exam  Completed

 

 2015  [id number]  Diabetic Retinal Eye Exam  Completed

 

 2014  [id number]  Diabetic Retinal Eye Exam  Completed

 

 2013  46883  EKG Tracing & Interpretation  Completed

 

 03/15/2013  91419  EKG Tracing & Interpretation  Completed

 

 10/26/2012  [id number]  Diabetic Retinal Eye Exam  Completed

 

 10/06/2012  [id number]  Diabetic Foot Exam  Completed







Encounters







 Type  Date  Location  Provider  Dx  Diagnosis

 

 Office Visit  2019  [facility]  Lilian Kaufman,  I49.5  Sick sinus



   2:00p  Of Carolyn  N.P.    syndrome









 I44.2  Atrioventricular block, complete

 

 Z95.0  Presence of cardiac pacemaker

 

 I48.0  Paroxysmal atrial fibrillation









 Office Visit  2019  1:00p  [facility]  Lilian KIRKLAND  I10  Essential (
primary)



     Of Carolyn Kaufman N.P.    hypertension









 R55  Syncope and collapse

 

 Z95.0  Presence of cardiac pacemaker

 

 I48.0  Paroxysmal atrial fibrillation









 [facility]  R[facility]  
Syncope and



     [facility]oc[facility]  [place]    collapse



     Hospitalists      









 R00.1  Bradycardia, unspecified

 

 I44.30  Unspecified atrioventricular block









 Office Visit  2019  4:28p  [facility]  Gabbie Doty  I48.0  
Paroxysmal atrial



     Of Carolyn BUCKNER    fibrillation









 I47.2  Ventricular tachycardia

 

 I50.9  Heart failure, unspecified









 [facility]30[facility]  R55  
Syncope and



     Assoc,pc  [place]    collapse



     Hospitalists      









 I48.0  Paroxysmal atrial fibrillation

 

 E11.9  Type 2 diabetes mellitus without complications

 

 I10  Essential (primary) hypertension









 Office Visit  2019 12:03p  [facility]  Gabbie Doty,  I47.2  
Ventricular



     Of Cma  M.D.    tachycardia









 I48.0  Paroxysmal atrial fibrillation

 

 I50.9  Heart failure, unspecified









 Office Visit  2019  4:29p  [facility]  Jose PINEDA  I49.5  Sick 
sinus



     Of Carolyn Patino M.D.    syndrome









 I44.1  Atrioventricular block, second degree

 

 R55  Syncope and collapse









 [facility] Visit  2019  9:29a  [facility]  R55  Syncope and



     Assoc,pc  Malorie Migdalia,    collapse



     Hospitalists  NP    









 F03.90  Unspecified dementia without behavioral disturbance

 

 E11.9  Type 2 diabetes mellitus without complications

 

 I10  Essential (primary) hypertension









 Office Visit  2019 11:21a  [facility]  Jose PINEDA  R55  Syncope 
and



     Of Carolyn Patino M.D.    collapse









 I49.5  Sick sinus syndrome









 Office Visit  2019  9:29a  [facility]  R55  Syncope and



     Assoc,pc  [facility] Migdalia,    collapse



     Hospitalists  NP    









 F03.90  Unspecified dementia without behavioral disturbance

 

 E11.9  Type 2 diabetes mellitus without complications

 

 I10  Essential (primary) hypertension

 

 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









 Office Visit  2019  3:27p  [facility]  Bryant SALAS  R55  Syncope 
and



       DUDLEY Klein    collapse









 R00.1  Bradycardia, unspecified

 

 I44.0  Atrioventricular block, first degree

 

 R94.31  Abnormal electrocardiogram [ECG] [EKG]

 

 R00.0  Tachycardia, unspecified

 

 D64.9  Anemia, unspecified

 

 E11.649  Type 2 diabetes mellitus with hypoglycemia without coma









 Office Visit  2019  9:28a  [facility]  Angélica Gordon,  R55  Syncope 
and



     Assoc,danie BUCKNER    collapse



     Hospitalists      









 R00.1  Bradycardia, unspecified

 

 E11.9  Type 2 diabetes mellitus without complications









 [facility]  2017  [facility]01p  [facility]  E11.22  Type 2 
diabetes



     Assoc,danie Lorenz M.D.    mellitus w



     Hospitalists      diabetic



           chronic kidney



           disease









 R33.8  Other retention of urine

 

 N18.3  Chronic kidney disease, stage 3 (moderate)

 

 I10  Essential (primary) hypertension









 [facility]  [facility]  [facility]  R33.8  Other 
retention



     Assoc,danie Lorenz M.D.    of urine



     Hospitalists      









 N18.3  Chronic kidney disease, stage 3 (moderate)

 

 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

 

 I10  Essential (primary) hypertension









 Office Visit  11/15/2017  4:00p  [facility]  [place]  R74.8  Abnormal 
levels



     Assdanie mills M.D.    of other serum



     Hospitalists      enzymes









 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

 

 I10  Essential (primary) hypertension

 

 E87.1  Hypo-osmolality and hyponatremia









 Office Visit  2017  3:58p  [facility]  Naye  E11.22  Type 2 
diabetes



     Assoc,MIRIAM AlejandreO.    mellitus w



     Hospitalists      diabetic



           chronic kidney



           disease









 E87.1  Hypo-osmolality and hyponatremia

 

 I10  Essential (primary) hypertension

 

 R74.8  Abnormal levels of other serum enzymes









 Office Visit  2017  [facility]  Kim  R41.0  Disorientation,



   3:20p  Redd Mead M.D.    unspecified



     [facility]      









 E87.1  Hypo-osmolality and hyponatremia

 

 I10  Essential (primary) hypertension

 

 F41.9  Anxiety disorder, unspecified

 

 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

 

 I49.9  Cardiac arrhythmia, unspecified









 Office Visit  10/18/2017 11:40a  [facility]  Molina Acuña,  E11.21  Type 2 
diabetes



     Medicine - [facility]  NP    mellitus with



           diabetic



           nephropathy









 E16.0  Drug-induced hypoglycemia without coma









 Office Visit  2017 10:00a  [facility]  Molina Acuña,  E11.21  Type 2 
diabetes



     Medicine - [facility]  NP    mellitus with



           diabetic



           nephropathy









 I10  Essential (primary) hypertension

 

 H53.8  Other visual disturbances









 Office Visit  2017  1:00p  [facility]  Molina Acuña,  E11.21  Type 2 
diabetes



     Medicine - [facility]  NP    mellitus with



           diabetic



           nephropathy









 I10  Essential (primary) hypertension









 Office Visit  2017 10:00a  [facility]  Molina Acuña,  E11.21  Type 2 
diabetes



     Medicine - [facility]  NP    mellitus with



           diabetic



           nephropathy

 

 Office Visit  2016  9:00a  [facility]  Molina Acuña,  E11.21  Type 2 
diabetes



     Medicine - [facility]  NP    mellitus with



           diabetic



           nephropathy









 R05  Cough









 Office Visit  2016  2:00p  [facility]  Molina Acuña,  E11.21  Type 2 
diabetes



     Medicine - [facility]  NP    mellitus with



           diabetic



           nephropathy

 

 Office Visit  02/10/2016 10:20a  [facility]  Molina Acuña,  R39.15  Urgency of



     Medicine - [facility]  NP    urination









 L03.031  Cellulitis of right toe









 Office Visit  2015  2:50p  [facility]  Elva  E11.21  Type 2 diabetes



     Redd Daniels M.D.    mellitus with



     [facility]      [facility]



           [facility]









 B37.2  Candidiasis of skin and nail









 Office Visit  2015 11:50a  [facility]  Elva  V70.0  Examination



     [facility]    General Medical



           Routine AT Health



           Care Facility









 250.42  Diabetes W/ Renal Manifestations Type II Uncontrolled

 

 401.1  Hypertension Benign

 

 272.4  Hyperlipidemia Other Unspec

 

 V03.82  Streptococcus Pneumoniae Vaccination Spec Other

 

 112.9  Candidiasis Unspec Site

 

 112.3  Candidiasis Skin & Nails









 Office Visit  2015  2:50p  [facility]  Elva Daniels  250.02  
Diabetes



     [facility]    [place]ell[place] W/O



           Compl Type II Or



           Unspec Type



           Uncontrol









 V04.89  Need For Prophylactic Vaccination & Inoculation Other Virus

 

 401.1  Hypertension Benign









 Office Visit  2015  2:30p  [facility]  Molina Acuña NP  466.0  
Bronchitis Acute



     [facility]      

 

 Office Visit  2015 10:30a  [facility]  Molina Acuña NP  466.0  
Bronchitis Acute



     [facility]      

 

 Office Visit  2015  2:50p  [facility]  Elva  250.02  Diabetes 
Mellitus



     Medicine - [facility] Daniels M.D.    W/O Compl Type II



           Or Unspec Type



           Uncontrol









 V03.82  Streptococcus Pneumoniae Vaccination Spec Other

 

 401.1  Hypertension Benign









 Office Visit  2014  3:00p  [facility]  Ferny Salinas,  250.02  Diabetes



     Medicine [facility]  FNP    [place]ell[place] W/O



           Compl Type II Or



           Unspec Type



           Uncontrol

 

 Office Visit  2014 10:40a  [facility]  Gabi Li,  250.02  Diabetes



     [facility]    [place] W/O



           Compl Type II Or



           Unspec Type



           Uncontrol









 272.4  Hyperlipidemia Other Unspec

 

 401.1  Hypertension Benign

 

 V70.0  Examination General Medical Routine AT Health Care Facility









 Office Visit  2014  1:40p  [facility]  Quiana Bond,  389.9  Hearing 
Loss



     Medicine [facility]  N.P.    Unspec

 

 Office Visit  2014  3:00p  [facility]  Gabi Li  250.02  Diabetes



     [facility]    [place] W/O



           Compl Type II Or



           Unspec Type



           Uncontrol

 

 Office Visit  2014  2:40p  [facility]  Gabi Li,  250.02  Diabetes



     Medicine - [facility] BUCKNER    [place]ell[place] W/O



           Compl Type II Or



           Unspec Type



           Uncontrol

 

 Office Visit  10/11/2013  2:40p  [facility]  Gabi Li,  250.02  Diabetes



     Medicine - [facility]  M.DAnamika    Mell[place] W/O



           Compl Type II Or



           Unspec Type



           Uncontrol









 272.4  Hyperlipidemia Other Unspec









 Office Visit  2013  1:00p  [facility]  Gabi Li,  250.02  Diabetes



     Medicine - [facility]  M.DAnamika    Mell[place] W/O



           Compl Type II Or



           Unspec Type



           Uncontrol









 785.1  Palpitations

 

 369.9  Visual Loss Unspec

 

 272.4  Hyperlipidemia Other Unspec

 

 401.1  Hypertension Benign









 Office Visit  2013 11:00a  [facility]  Awa Victoria,  786.2  Cough



     Medicine - [facility]  M.D., FACP    

 

 Office Visit  03/15/2013  3:00p  [facility]  Gabi Li,  250.02  Diabetes 
Mellitus



     Medicine - [facility]olfo  MMELISSA    W/O Compl Type II



           Or Unspec Type



           Uncontrol









 389.9  Hearing Loss Unspec

 

 369.9  Visual Loss Unspec

 

 786.2  Cough

 

 427.9  Cardiac Dysrhythmia Unspec









 Office Visit  2012  3:20p  [facility]  Gabi Li,  250.02  Diabetes 
Mellitus



     Medicine -  MMELISSA    W/O Compl Type II



     [facility]      Or Unspec Type



           Uncontrol

 

 Office Visit  2012 10:00a  [facility]  Nurse Visit A  401.1  
Hypertension Benign



     Medicine -      



     [facility]      

 

 Office Visit  10/05/2012  4:00p  [facility]  Gabi Li,  250.02  Diabetes 
Mellitus



     Medicine -  MMELISSA    W/O Compl Type II



     [facility]      Or Unspec Type



           Uncontrol









 272.4  Hyperlipidemia Other Unspec

 

 401.1  Hypertension Benign









 Office Visit  2012  4:00p  [facility]  Gabi Li,  250.02  Diabetes



     Medicine - Karen BUCKNER    Mellitus W/O



           Compl Type II Or



           Unspec Type



           Uncontrol

 

 Office Visit  2012  4:20p  [facility]  Gabi Li  250.02  Diabetes



     Medicine - [facility]  MAnamikaDAnamika    Mellitus W/O



           Compl Type II Or



           Unspec Type



           Uncontrol

 

 Office Visit  2012  3:00p  [facility]  Gabi Li,  250.02  Diabetes



     Medicine - Karen BUCKNER    Mellitus W/O



           Compl Type II Or



           Unspec Type



           Uncontrol









 272.4  Hyperlipidemia Other Unspec

 

 401.1  Hypertension Benign

 

 285.9  Anemia Unspec







Plan of Treatment

Future Appointment(s):10/03/2019  2:40 pm - Bryant Klein M.D. at [facility]10/2019  1:30 pm - [facility] Pacer Schedule at [facility] - Lilian Kaufman N.P.I49.5 Sick sinus hjitfbppO91.2 Atrioventricular 
block, lltzlkvjD64.0 Presence of cardiac pacemakerFollow up:PO/OV 6mo 
JFMRecommendations:Pacer working well 33 seconds of afib continue eliquis.  ok 
to remove sling keep arm below shoulder for 2 more weeks.I48.0 Paroxysmal 
atrial fibrillation

## 2019-08-04 NOTE — HP
CC:  Physician at [facility]  *

 

HISTORY AND PHYSICAL:

 

DATE OF ADMISSION:  08/04/19

 

PROVIDER:  Floridalma Chaudhry NP.

 

PRIMARY CARE PROVIDER:  Physician at [facility].

 

ATTENDING PHYSICIAN:  Dr. Cristóbal Lorenz.* (DICTATED BY FLORIDALMA CHAUDHRY NP)

 

CHIEF COMPLAINT:  Confusion.

 

HISTORY OF PRESENT ILLNESS:  Mr. Pelaez is an 85-year-old male with a past 
medical history significant for hypertension; history of atrial fibrillation, 
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 
2 diabetes; and hyperlipidemia who presented to the emergency room with 
confusion. Per the emergency room record, a code gray was called prior to the 
patient's arrival.  The patient was given Versed 5 mg IV at 12:28 for a 
witnessed seizure by EMS.  His last known well time was 11:47.  The patient was 
on Eliquis.  The patient at the nursing home was noted to have slurred speech 
and confusion.  Staff reports that the patient is normally alert and oriented.  
The patient had right arm weakness.  He also had a seizure with full body tonic 
in the leg with no incontinence.

 

Initially, when EMS arrived, the patient could answer some questions and 
reported, "I feel like I'm dying."  The patient has no known history of 
seizures or stroke. The patient had a blood sugar of 102 this morning.  He was 
given lispro 4 units at 0640 and Lantus 24 units at 0942.  Upon arrival to the 
emergency room, the patient was noted to have a blood glucose of 16.  He was 
given an amp of D50 and started on D5W at 100 cc per hour.  Code gray was 
canceled.  While in the emergency room, the patient continued to have his blood 
sugars monitored.  His blood sugars continued to trend down after the initial 
D50 and trended down to 68.  The patient did receive a second amp of D50 and 
blood sugars have been continued to be monitored. His blood sugars again are 
trending down.  Last blood sugar was 134.  The patient's IV fluids have been 
switched to D10 at 100 cc per hour.

 

Upon evaluation of the patient in the emergency room, he does open his eyes to 
name.  He remains lethargic.  He is able to move all 4 extremities.  He is 
unable to voice any complaints or answer questions at this time, but does 
respond to fingersticks with saying "ouch" with the pain.

 

I did discuss with his daughter, Ramy.  The patient's baseline is alert and 
oriented, very sharp, is able to ambulate independently at [facility]. She does report that they have been having difficulty controlling his 
blood sugars and that the patient is not eating properly at the nursing home.  
She reports that the patient was initially placed in the nursing home due to 
being unable to control his blood sugars and family unable to care for him at 
home.

 

Due to the patient's hypoglycemia and altered mental status, we were asked to 
see and evaluate the patient for admission.

 

PAST MEDICAL HISTORY:  Significant for:

1.  AV block, status post pacemaker in June 2019.

2.  History of atrial fibrillation.

3.  Hypertension.

4.  Hyperlipidemia.

5.  BPH.

6.  Hard of hearing.

7.  Type 2 diabetes.

 

PAST SURGICAL HISTORY:

1.  Status post pacemaker.

2.  Tonsillectomy.

3.  Cyst removed from his back.

4.  TURP.

5.  Umbilical hernia repair.

 

HOME MEDICATIONS:  Include:

1.  Tamsulosin 0.4 mg p.o. at bedtime.

2.  Senna 2 tabs p.o. b.i.d.

3.  Metoprolol 50 mg p.o. daily.

4.  Losartan 25 mg p.o. daily.

5.  Lispro 4 units subcu b.i.d.

6.  Lantus 24 units subcu daily.

7.  Furosemide 60 mg p.o. daily.

8.  Eliquis 5 mg p.o. b.i.d.

 

ALLERGIES:

1.  BACITRACIN.

2.  IV CONTRAST DYE.

3.  NEOMYCIN.

4.  HYDROCHLOROTHIAZIDE.

5.  MAXZIDE.

 

FAMILY HISTORY:  Mother had a history of stroke and diabetes.  Father with 
history of skin cancer.  Sister with uterine cancer.

 

SOCIAL HISTORY:  Obtained from his previous records.  The patient with no 
history of alcohol, tobacco, or illicit drug use.  He currently resides at [facility].  Per the family, he does ambulate independently and does 
participate in PT at [place].  He is hard of hearing.  Surrogate decision 
maker in the event he is unable to make his own decisions is his daughter, 
Ramy.

 

REVIEW OF SYSTEMS:  Unable to complete full review of systems as the patient is 
drowsy from receiving 5 of Versed.  The family reports that the patient has had 
no complaints of cough or congestion, no reported fever or chills, but at this 
time, I am unable to obtain a review of systems.

 

                               PHYSICAL EXAMINATION

 

GENERAL:  At this time, Mr. Pelaez is an 85-year-old male.  He is drowsy, 
does awake to verbal stimuli, resting on the stretcher in the emergency room.  
He appears well nourished and stated age.

 

VITAL SIGNS:  Blood pressure 130/56, heart rate is 75, respirations are 14 to 20
, O2 saturation 98%, temperature 96.9.

 

HEENT:  Head is atraumatic, normocephalic.  Eyes:  EOMs are intact.  Sclerae 
anicteric and not pale.  Pupils are equal and reactive to light.  Oral mucosa 
is moist.

 

NECK:  Supple.

 

LUNGS:  Clear to auscultation bilaterally, diminished in the bases.

 

CARDIAC:  S1, S2.  Regular rate and rhythm.  No murmurs or gallops.

 

ABDOMEN:  Soft and nontender.  Bowel sounds are present x4.

 

EXTREMITIES:  There is no pitting edema.  He is able to move all 4 extremities. 
There is no clubbing or cyanosis.

 

NEUROLOGIC:  He is drowsy.  He does open his eyes to name, but falls back to 
sleep quickly.  There are no gross focal deficits noted.

 

SKIN:  He does have a skin tear and ecchymosis noted to the left hand.  He does 
have ecchymosis noted to the left shoulder.  He does have a skin tear noted to 
the posterior right upper arm.

 

 DIAGNOSTIC STUDIES/LAB DATA:  WBCs are 7.9, RBCs 3.79, hemoglobin 11.2, 
hematocrit was 33, platelet count was 324.  INR was 1.34, aPTT was 36.9.  
Sodium 136; potassium was 3.1; chloride 104; carbon dioxide was 23; anion gap 
was 9; BUN was 26; creatinine 1.22; glucose initially was 16, repeat was 170; 
lactic acid was 2.5; calcium 8.5.  ASTs were 12, ALTs were 7, alkaline 
phosphatase was 55.  Troponin was 0.05.  Hemoglobin A1c is pending.  Magnesium 
is pending.  Urine was within normal limits, with the exception of specific 
gravity was 1.006.

 

The patient had a CT of the brain, radiologist's impression:  No acute 
intracranial abnormality.  MRI is more sensitive for acute infarct.  Mild 
cerebral volume loss. Mild chronic small-vessel disease is likely.

 

He had an electrocardiogram, which showed a paced rhythm, AV dual paced complex 
at a rate of 76.

 

ASSESSMENT AND PLAN:  Mr. Pelaez is an 85-year-old male with a past medical 
history significant for history of atrial fibrillation; atrioventricular block, 
status post pacemaker; type 2 diabetes; hypertension; hyperlipidemia; BPH who 
presented to the emergency room with confusion, seizure witnessed by EMS, 
initially a code gray which was canceled and found to have initial blood sugar 
of 16.  He will be admitted to the ICU inpatient for:



1.  Hypoglycemia.  I suspect his hypoglycemia is related to continued use of 
Lantus and lispro as the patient did have a blood sugar of 102 this morning and 
he did receive 24 units of Lantus and lispro 4 units.  On arrival to the ER his 
blood glucose was 16.  I will hold his lispro and Lantus at this time.  He has 
received 2 amps of D50 in the emergency room and was on D5W. His blood sugars 
continued to trend downward.  I will place him on D10 at 100 cc per hour.  We 
will place him on hourly Accu-Cheks.

2.  Altered mental status.  I suspect his altered mental status is related to 
his severe hypoglycemia.  The patient also had a witnessed seizure by EMS and 
was given 5 mg of Versed, which could also be contributing to his altered 
mental status at this time.  I will place him on neuro checks q.2 hours and 
observe for any acute changes. CT of the brain was negative for acute stroke.  

3.  Hypertension.  He will continue on losartan and metoprolol as previously 
prescribed.

4.  Type 2 diabetes.  I am going to hold his Lantus and lispro at this time due 
to his profound hypoglycemia on arrival.  I will get a hemoglobin A1c. Further 
recommendation of blood sugar management will be based on future blood sugars.  
Likely will need to change outpatient blood sugar management as this is his 3 
episode of hypoglycemia.  Patient would benefit from an outpatient consult to 
Dr. Juarez. 

5.  Acute kidney injury.  The patient does have acute kidney injury.  I suspect 
this is related to dehydration.  I will give the patient hydration.  We will 
repeat a BMP in the a.m. and avoid nephrotoxic medications.

6.  Hypokalemia.  The patient does have a potassium of 3.1.  I will give him 60 
mEq of potassium IV and repeat a BMP in the a.m.

7.  History of atrioventricular block, status post permanent pacemaker 
placement. The patient will be monitored on telemetry.  He has no acute signs 
or symptoms of pacemaker malfunction.  We can interrogate his pacemaker in the 
morning if needed. EKG is consistent with completely paced rhythm.

8.  Benign prostatic hypertrophy.  He will continue on Flomax as previously 
prescribed.

9.  Atrial fibrillation.  We will continue him on Eliquis and metoprolol as 
previously prescribed.

10. Elevated Troponin.  Suspect this is related to demand ischemia. The patient 
did have a witnessed seizure and was hypotension and hypoglycemia with a blood 
glucose of 16 on arrival to the ER.  Will continue to trend troponins and 
repeat EKG in the AM.  The patient was able to report that he has no chest 
pain.  Should his troponins continue to elevate will consult cardiology. 

11. Seizure.  Suspect this was related to his hypoglycemia.  Will place patient 
on seizure precautions and monitor .  Should the patient have any further 
episodes of seizures will consult neurology.  

12. Elevated Lactic acid.  I suspect this is related to his seizure and 
hypotension.  Patient has no sign acute infection or sepsis at this time.  He 
is afebrile and WBC's are with in normal limits and he is not tachycardic.  

12.  FEN:  He can have a regular diet.

13.  Code status:  He is a full code.

14.  DVT prophylaxis:  The patient currently takes Eliquis for history of 
atrial fibrillation.

 

TIME SPENT:  Time spent on this admission was approximately 70 minutes; greater 
than half that time was spent at the bedside reviewing events leading thus far 
to his hospitalization, performing physical exam, and reviewing my plan of care.

 

I have discussed this with my attending, Dr. Cristóbal Lorenz; he is in 
agreement with my plan.

 

 ____________________________________ FLORIDALMA CHAUDHRY, NP

 

[id number]/CPS #: [id number]

LOTTIE

## 2019-08-05 LAB
ANION GAP SERPL CALC-SCNC: 4 MMOL/L (ref 2–11)
BASOPHILS # BLD AUTO: 0.1 10^3/UL (ref 0–0.2)
BUN SERPL-MCNC: 20 MG/DL (ref 6–24)
BUN/CREAT SERPL: 21.1 (ref 8–20)
CALCIUM SERPL-MCNC: 8.2 MG/DL (ref 8.6–10.3)
CHLORIDE SERPL-SCNC: 107 MMOL/L (ref 101–111)
EOSINOPHIL # BLD AUTO: 0 10^3/UL (ref 0–0.6)
GLUCOSE SERPL-MCNC: 164 MG/DL (ref 70–100)
HCO3 SERPL-SCNC: 24 MMOL/L (ref 22–32)
HCT VFR BLD AUTO: 31 % (ref 42–52)
HGB BLD-MCNC: 10.5 G/DL (ref 14–18)
INR PPP/BLD: 1.32 (ref 0.82–1.09)
LYMPHOCYTES # BLD AUTO: 1 10^3/UL (ref 1–4.8)
MAGNESIUM SERPL-MCNC: 2.1 MG/DL (ref 1.9–2.7)
MCH RBC QN AUTO: 29 PG (ref 27–31)
MCHC RBC AUTO-ENTMCNC: 34 G/DL (ref 31–36)
MCV RBC AUTO: 87 FL (ref 80–94)
MONOCYTES # BLD AUTO: 0.8 10^3/UL (ref 0–0.8)
NEUTROPHILS # BLD AUTO: 8.3 10^3/UL (ref 1.5–7.7)
NRBC # BLD AUTO: 0 10^3/UL
NRBC BLD QL AUTO: 0
PLATELET # BLD AUTO: 287 10^3/UL (ref 150–450)
POTASSIUM SERPL-SCNC: 4.6 MMOL/L (ref 3.5–5)
RBC # BLD AUTO: 3.61 10^6 /UL (ref 4.18–5.48)
SODIUM SERPL-SCNC: 135 MMOL/L (ref 135–145)
TROPONIN I SERPL-MCNC: 1.35 NG/ML (ref ?–0.04)
TROPONIN I SERPL-MCNC: 1.68 NG/ML (ref ?–0.04)
TROPONIN I SERPL-MCNC: 1.71 NG/ML (ref ?–0.04)
WBC # BLD AUTO: 10.2 10^3/UL (ref 3.5–10.8)

## 2019-08-05 RX ADMIN — NYSTATIN SCH APPLIC: 100000 POWDER TOPICAL at 13:40

## 2019-08-05 RX ADMIN — NYSTATIN SCH APPLIC: 100000 POWDER TOPICAL at 20:25

## 2019-08-05 RX ADMIN — METOPROLOL SUCCINATE SCH MG: 50 TABLET, EXTENDED RELEASE ORAL at 08:47

## 2019-08-05 RX ADMIN — NYSTATIN SCH APPLIC: 100000 POWDER TOPICAL at 08:47

## 2019-08-05 RX ADMIN — ASPIRIN SCH MG: 81 TABLET, CHEWABLE ORAL at 13:40

## 2019-08-05 RX ADMIN — TAMSULOSIN HYDROCHLORIDE SCH MG: 0.4 CAPSULE ORAL at 20:25

## 2019-08-05 RX ADMIN — LOSARTAN POTASSIUM SCH MG: 25 TABLET, FILM COATED ORAL at 08:47

## 2019-08-05 RX ADMIN — ATORVASTATIN CALCIUM SCH MG: 40 TABLET, FILM COATED ORAL at 17:49

## 2019-08-05 NOTE — ECHO
*[facility]*

[place] [address]

Phone: [phone]

Fax #: [phone]



-------------------------------------------------------------------

Transthoracic Echocardiogram



Patient: Donovan Pelaez                 MRN:        [id number]

:     1934                         Study Date: 2019

Age:     85                                 Accession#: [id number]

Gender:  M                                  HR:         68 bpm

Height:  69 in /175.3 cm                    BSA:        2.11 m^2

Weight:  196.6 lb /89.4 kg                  BMI:        29.1 kg/m^2



*Sonographer: * Dionne Bruno RDCS RN

 

*Referring Physician: * Floridalma Chaudhry

*Reading Physician: * Derian Adams MD



-------------------------------------------------------------------

Indications:   Abnormal EKG.



-------------------------------------------------------------------

History:   Atrial fibrillation.  Risk factors:  Hypertension.

Diabetes mellitus. Dyslipidemia.



Labs, prior tests, procedures, and surgery:

Permanent pacemaker system implantation.



-------------------------------------------------------------------

Conclusions



Summary:



- Procedure narrative: Transthoracic echocardiography was

  performed. The study was technically limited due to body habitus.

  Intravenous Definity 3 ml was administered for image enhancement.

- Left ventricle: Wall thickness is mildly increased. Systolic

  function is moderately reduced. The estimated ejection fraction

  is 35%.

- Right ventricle: The cavity size is normal. Pacer wire noted in

  the right ventricle. Systolic function is normal.

- Left atrium: The atrium is mildly dilated.

- Pulmonary arteries: Systolic pressure is within the normal range,

  estimated to be 32 mm Hg.

- No significant valvular abnormalities noted.



Recommendations:  Compared to prior study from 6/3/2019, the LVEF

is now significantly reduced and a new wall motion abnormality is

noted.



-------------------------------------------------------------------

Study data:  Transthoracic echocardiogram.  Procedure:

Transthoracic echocardiography was performed. The study was

technically limited due to body habitus. Intravenous Definity 3 ml

was administered for image enhancement.  Complete 2D, spectral

Doppler, and color flow Doppler.  Location:  Bedside.  Patient

status:  Inpatient. Patient room number: ICU 10.  Rhythm:  Normal

sinus rhythm.



-------------------------------------------------------------------

Findings



Left ventricle:   Wall thickness is mildly increased.   Systolic

function is moderately reduced. The estimated ejection fraction is

35% with extensive proximal to mid left anterior descending

coronary territory wall motion abnormalities  Regional wall motion

abnormalities:  Akinesis of the apical myocardium; severe

hypokinesis of the mid-apical anterior, mid inferoseptal, and

apical septal myocardium; moderate hypokinesis of the mid

anteroseptal myocardium; mild hypokinesis of the basal

anteroseptal, apical inferior, and apical lateral myocardium;

cannot exclude of the basal anterior myocardium. Doppler parameters

are consistent with abnormal left ventricular relaxation (grade 1

diastolic dysfunction).

Right ventricle:  The cavity size is normal. Pacer wire noted in

the right ventricle. Systolic function is normal.

Left atrium:  The atrium is mildly dilated.

Right atrium:  The atrium is normal in size. Pacer wire noted in

right atrium.

Mitral valve:  Is mildly calcified. The leaflets are mildly

thickened.  There is no evidence of stenosis.   There is no

regurgitation.

Aortic valve:   The valve is probably trileaflet. The leaflets are

mildly thickened.  There is no evidence of stenosis.   There is no

significant regurgitation.

Tricuspid valve:   The valve is structurally normal.    There is no

evidence of stenosis.   There is trace regurgitation.

Pulmonic valve:   Not well visualized. There is no significant

regurgitation.

Aorta:  Aortic root: The aortic root is not dilated.

Ascending aorta: The ascending aorta is not dilated.

Aortic arch: The aortic arch is not dilated.

Pericardium:  There is no pericardial effusion.

Pulmonary arteries:

Not well visualized. Systolic pressure is within the normal range,

estimated to be 32 mm Hg.

Systemic veins:

Inferior vena cava: The vessel is normal in size. There is (&gt;= 50%)

respiratory change in the IVC dimension.



-------------------------------------------------------------------

Measurements



 Left ventricle              Value        Ref       Aortic valve              Value       Ref

 FERNANDO, LAX                    4.7   cm     4.2 -     Peak v, S                 1.25  m/sec -----

                                          5.8       VTI, S                    27.7  cm    -----

 ESD, LAX                    3.2   cm     2.5 -     Mean grad, S              3.7   mm Hg -----

                                          4.0       Peak grad, S              6.3   mm Hg -----

 FS, LAX                     33    %      25 - 43   LVOT/AV, VTI ratio        0.81        -----

 PW, ED              (H)     1.1   cm     0.6 -

                                          1.0       Mitral valve              Value       Ref

 IVS/PW, ED                  1.17         --------  Peak E                    0.94  m/sec -----

 E&apos;, lat dale, TDI    (L)     8.0   cm/sec &gt;=10.0    Peak A                    1.11  m/sec ---
--

 E/e&apos;, lat dale, TDI          12           --------  Decel time                223   ms    -----

 E&apos;, med dale, TDI    (L)     6.0   cm/sec &gt;=7.0     Peak grad, D              3.5   mm Hg ---
--

 E/e&apos;, med dale, TDI          16           --------  Peak E/A ratio            0.84        -----

 E&apos;, avg, TDI                7.0   cm/sec --------

 E/e&apos;, avg, TDI              13           &lt;=14      Pulmonic valve            Value       Ref


                                                    Peak v, S                 0.81  m/sec -----

 LVOT                        Value        Ref       Peak grad, S              2.6   mm Hg -----

 Peak noé, S                 0.83  m/sec  --------

 VTI, S                      22.4  cm     --------  Tricuspid valve           Value       Ref

 Peak grad, S                3     mm Hg  --------  TR peak v                 2.7   m/sec &lt;=2.8

 Mean grad, S                2     mm Hg  --------  Peak RV-RA grad, S        29    mm Hg -----

 

 Ventricular septum          Value        Ref       Aortic root               Value       Ref

 IVS, ED             (H)     1.3   cm     0.6 -     Root diam                 3.1   cm    &lt;4.2

                                          1.0

                                                    Ascending aorta           Value       Ref

 Right ventricle             Value        Ref       AAo AP diam, S            2.9   cm    -----

 FERNANDO, LAX                    2.5   cm     --------

 FERNANDO minor ax, A4C           3.2   cm     1.9 -     Aortic arch               Value       Ref

 mid                                      3.5       Arch diam                 2.7   cm    -----

 Pressure, S                 32    mm Hg  --------

                                                    Decending aorta           Value       Ref

 Left atrium                 Value        Ref       Patricia peak noé              0.59  m/sec -----

 LA ID                       3.5   cm     --------

 SI dim ES, LAX              3.5   cm     --------  Pulmonary artery          Value       Ref

 ML dim, A4C                 4.0   cm     --------  Pressure, S               32.0  mm Hg -----

 SI dim, A4C                 4.3   cm     --------

 Vol, ES, 2-p                67    ml     --------  Inferior vena cava        Value       Ref

 Vol/bsa, ES, 2-p            32    ml/m^2 16 - 34   Diam                      1.3   cm    -----

 

 Right atrium                Value        Ref

 ML dim, ES, A4C             3.9   cm     2.6 -

                                          4.4

 SI dim, ES, A4C             3.5   cm     3.4 -

                                          5.3

 Estimated RAP               3     mm Hg  --------

 

Legend:

(L)  and  (H)  maria [place] carmen values outside specified reference range.



Prepared and electronically signed by



Derian Adams MD

2019 09:41

## 2019-08-05 NOTE — PN
Subjective


Date of Service: 08/05/19


Interval History: 





Alert and oriented this morning.  Patient reports that he consistently feels 

his blood sugar is low at the nursing home and he does not get enough food to 

eat. Reports that they do not let him eat peanut butter sandwiches at night, 

which he reports that he has used in the past to keep his blood sugar more 

consistent.  States that he gets up and has to drink juice during the night 

because his blood sugar feels low.  Reports this is the 2 second episode of 

hypoglycemia for which he has had a seizure as a result of hypoglycemia.





Patient reports prior to his low sugar he remembers feeling confused, and 

called for help because he did not know how to use his finger stick machine.  

Reports that he never had any chest pain or shortness of breath.  





Denies any chest pain overnight or this morning.  Denies shortness of breath.  

Denies abd pain n/v/d. Denies fever or chills. Reports that he did not sleep 

well last night d/t being concerned his blood sugar was be low again. 





Lab work reviewed - Troponin peaked at 1.70 - patient with EKG with ST 

depressions in the lateral leads. Given loading dose of Plavix and 

atorvastatin.  


Family History: Unchanged from Admission


Social History: Unchanged from Admission


Past Medical History: Unchanged from Admission





Objective


Active Medications: 








Acetaminophen (Tylenol Tab*)  650 mg PO Q4H PRN


   PRN Reason: FEVER/PAIN


Losartan Potassium (Cozaar Tab*)  25 mg PO DAILY [facility]


   Last Admin: 08/05/19 08:47 Dose:  25 mg


Metoprolol Succinate (Toprol Xl Tab*)  50 mg PO DAILY [facility]


   Last Admin: 08/05/19 08:47 Dose:  50 mg


Nystatin (Nystatin Top Powder*)  1 applic TOPICAL TID [facility]


   Last Admin: 08/05/19 08:47 Dose:  1 applic


Tamsulosin HCl (Flomax Cap*)  0.4 mg PO BEDTIME [facility]


   Last Admin: 08/04/19 21:01 Dose:  0.4 mg








 Vital Signs - 8 hr











  08/05/19 08/05/19 08/05/19





  03:35 04:00 04:51


 


Temperature 99.2 F  


 


Pulse Rate  67 


 


Respiratory  23 21





Rate   


 


Blood Pressure  127/45 





(mmHg)   


 


O2 Sat by Pulse  96 





Oximetry   














  08/05/19 08/05/19 08/05/19





  05:00 05:08 05:52


 


Temperature   


 


Pulse Rate 62 64 


 


Respiratory 14 15 20





Rate   


 


Blood Pressure 112/45 112/45 





(mmHg)   


 


O2 Sat by Pulse 95 95 





Oximetry   














  08/05/19 08/05/19 08/05/19





  06:00 06:59 07:00


 


Temperature   


 


Pulse Rate 63  64


 


Respiratory 18 24 18





Rate   


 


Blood Pressure 122/48  123/53





(mmHg)   


 


O2 Sat by Pulse 96  95





Oximetry   














  08/05/19 08/05/19 08/05/19





  07:02 08:00 08:01


 


Temperature  98.8 F 


 


Pulse Rate 73  60


 


Respiratory 17 14 14





Rate   


 


Blood Pressure   99/34





(mmHg)   


 


O2 Sat by Pulse 97  93





Oximetry   














  08/05/19 08/05/19 08/05/19





  08:53 09:00 09:01


 


Temperature   


 


Pulse Rate 66 67 78


 


Respiratory 22 21 21





Rate   


 


Blood Pressure 123/49 139/43 





(mmHg)   


 


O2 Sat by Pulse 96 94 95





Oximetry   














  08/05/19 08/05/19





  09:53 10:00


 


Temperature  


 


Pulse Rate 74 69


 


Respiratory 23 20





Rate  


 


Blood Pressure 122/37 125/41





(mmHg)  


 


O2 Sat by Pulse 97 96





Oximetry  











Oxygen Devices in Use Now: None


Eyes: No Scleral Icterus


Ears/Nose/Mouth/Throat: Clear Oropharnyx, Mucous Membranes Moist


Neck: NL Appearance and Movements; NL JVP, Trachea Midline


Respiratory: Symmetrical Chest Expansion and Respiratory Effort, Clear to 

Auscultation


Cardiovascular: NL Sounds; No Murmurs; No JVD, No Edema


Abdominal: NL Sounds; No Tenderness; No Distention


Extremities: No Edema, No Clubbing, Cyanosis


Neurological: Alert and Oriented x 3


Nutrition: Taking PO's


Result Diagrams: 


 08/05/19 04:12





 08/05/19 04:12


Microbiology and Other Data: 


 Microbiology











 08/04/19 18:26 Nasal Screen MRSA (PCR) - Final





 Nasal    Mrsa Not Detected














Assess/Plan/Problems-Billing


Assessment: 











- Patient Problems


(1) Hypoglycemia due to insulin


Current Visit: Yes   Status: Acute   Code(s): E16.0 - DRUG-INDUCED HYPOGLYCEMIA 

WITHOUT COMA; T38.3X5A - ADVERSE EFFECT OF INSULIN AND ORAL HYPOGLYCEMIC DRUGS, 

INIT   SNOMED Code(s): 193324050


   Comment: Resolved


- Blood sugar was 16 on arrival to the ER- given D50 x2 and D10 IVF- now 

stopped 


- suspect this is related to his current insulin doses


- will stop lantus and lispro


- continue to monitor fingerticks


- A1C 7.3


   





(2) Seizure


Current Visit: Yes   Status: Acute   Code(s): R56.9 - UNSPECIFIED CONVULSIONS   

SNOMED Code(s): 91952130


   Comment: 


Witnessed by EMS- This was likely releated to hypoglycemia- arrival blood sugar 

was 16


- will place on seizure precautions and monitor 





   





(3) Elevated troponin


Current Visit: Yes   Status: Acute   Code(s): R74.8 - ABNORMAL LEVELS OF OTHER 

SERUM ENZYMES   SNOMED Code(s): 713071487


   Comment: 


- suspect this is related to demand ischemia - d/t hypoglycemia, seizure and 

hypotension. 


- troponin peaked at 1.70 trending down 1.30 this morning 


- given plavix 300mg and atrovastatin, ASA 


- willcontinue ASA, Lipitor, plavix and metoprolol 


- Cardiology to consult    





(4) Acute kidney injury


Current Visit: No   Status: Acute   Code(s): N17.9 - ACUTE KIDNEY FAILURE, 

UNSPECIFIED   SNOMED Code(s): 76338745


   Comment: 


likely d/t dehydration 


- patient was given IVF - now resolved


   





(5) Diabetes


Current Visit: No   Status: Acute   Code(s): E11.9 - TYPE 2 DIABETES MELLITUS 

WITHOUT COMPLICATIONS   SNOMED Code(s): 89178803


   Comment: 


 - will chnage fingersticks to Q4 hours


- NO lispro or Lantus -d/t hypoglycemia 


- Will likely need to decrease or stop all insulins as patient has had 3 

episodes of hypoglycemia 2 with seizures


- A1C 7.3


- Blood sugars 151-155 this morning- will continue to monitor     





(6) HTN (hypertension)


Current Visit: No   Status: Acute   Code(s): I10 - ESSENTIAL (PRIMARY) 

HYPERTENSION   SNOMED Code(s): 69453190


   Comment: 


 - continue -Losartan, metoprolol


   





(7) DVT prophylaxis


Current Visit: No   Status: Acute   Code(s): Z29.9 - ENCOUNTER FOR PROPHYLACTIC 

MEASURES, UNSPECIFIED   SNOMED Code(s): 239541839


   Comment: 


-Eliquis   





(8) Full code status


Current Visit: Yes   Status: Acute   Code(s): Z78.9 - OTHER SPECIFIED HEALTH 

STATUS   SNOMED Code(s): 250951794


   


Status and Disposition: 





inpatient

## 2019-08-05 NOTE — CONSULT
Subjective


Date of Service: 08/05/19


Interval History: 





Admission Date:    08/04/19


consult date 8/5/2019


PCP:  [facility]


CC: Confusion


Reason for consult: type 2 MI vs. stress cardiomyopathy


 


HISTORY OF PRESENT ILLNESS:  Mr. Pelaez is an 85-year-old man with a history 

as below.  He is accompanied by 2 of his daughters.  He is typically 

cognitively well per daughters (mild stable cognitive impairment listed in 

outpatient note which is likely) and walks without assistance or symptoms.  He 

lives with his girlfriend at [facility].  He is still AAox3 but is hard of 

hearing and still a little bit confused after the medical event but his 

daughters say this has been improving.  He was admitted with symptomatic 

hypoglycemia with a glucose of 16 and found to be lethargic and had a witnessed 

seizure.  He has improved and continues to improve since yesterday.  He 

apparently has had issues regulating glucose levels in the past as well and 

this was the main reason he was at [facility].  During the course of 

hospitalization he was found to have an abnormal troponin level which prompted 

further evaluation.  Patient tells me he had "heartburn" in his chest and neck 

last night for about 30 minutes.  He says he had this a long time ago but not 

recently and not with exertion.  He is currently asymptomatic.  





Pmhx:


hypertension


Paroxysmal atrial fibrillation on Eliquis last dose evening of 8/4/2019


Heart block and syncope status post pacemaker in June 2019


BPH


type 2 diabetes


mild anemia, stable





PAST SURGICAL HISTORY:


1.  Status post pacemaker.


2.  Tonsillectomy.


3.  Cyst removed from his back.


4.  TURP.


5.  Umbilical hernia repair.





ALLERGIES:


1.  BACITRACIN.


2.  IV CONTRAST DYE.


3.  NEOMYCIN.


4.  HYDROCHLOROTHIAZIDE.


5.  MAXZIDE.





FAMILY HISTORY:  Mother had a history of stroke and diabetes.  Father with 

history of skin cancer.  Sister with uterine cancer.





SOCIAL HISTORY:  Obtained from his previous records.  Remote tobacco use, no 

excessive alcohol or drugs.  He currently resides at [facility].  

Per the family, he does ambulate independently and does participate in PT at 

[facility].  He is hard of hearing.  Surrogate decision maker in the event he is 

unable to make his own decisions is his daughterRamy.








Medications


Active Medications: 








Acetaminophen (Tylenol Tab*)  650 mg PO Q4H PRN


   PRN Reason: FEVER/PAIN


Aspirin (Aspirin 81 Mg Chew Tab*)  81 mg PO DAILY [facility]


Clopidogrel Bisulfate (Plavix Tab*)  75 mg PO DAILY [facility]


Losartan Potassium (Cozaar Tab*)  25 mg PO DAILY [facility]


   Last Admin: 08/05/19 08:47 Dose:  25 mg


Metoprolol Succinate (Toprol Xl Tab*)  50 mg PO DAILY [facility]


   Last Admin: 08/05/19 08:47 Dose:  50 mg


Nystatin (Nystatin Top Powder*)  1 applic TOPICAL TID [facility]


   Last Admin: 08/05/19 08:47 Dose:  1 applic


Tamsulosin HCl (Flomax Cap*)  0.4 mg PO BEDTIME [facility]


   Last Admin: 08/04/19 21:01 Dose:  0.4 mg





lipitor 40 mg po daily ordered


Patient received clopidogrel 300 mg po last PM and another dose today for 600 

mg total


Home Medications: 


 


Losartan TAB* [Cozaar TAB*] 25 mg PO DAILY  tab 11/17/17 [Rx Confirmed 08/04/19]


Tamsulosin CAP* [Flomax CAP*] 0.4 mg PO BEDTIME  cap 11/17/17 [Rx Confirmed 08/ 04/19]


Insulin GLARGINE(*) [Lantus(*)] 24 units SUBCUT DAILY 06/02/19 [History 

Confirmed 08/04/19]


Insulin Lispro [Insulin Lispro Kwikpen U-100] 4 unit SQ BID 06/02/19 [History 

Confirmed 08/04/19]


Sennosides/Docusate Sodium [Senna Plus Tablet] 2 each PO BID 06/02/19 [History 

Confirmed 08/04/19]


Metoprolol Succinate XL TAB* [Toprol XL TAB*] 50 mg PO DAILY #30 tab.xl 06/05/ 19 [Rx Confirmed 08/04/19]


Furosemide 40 mg PO DAILY #0 06/06/19 [Rx Confirmed 08/04/19]


Apixaban* [Eliquis*] 5 mg PO BID 08/04/19 [History Confirmed 08/04/19]








Review of Systems





- Measurements


Intake and Output: 


Intake and Output Last 24 Hours











 08/03/19 08/04/19 08/05/19 08/06/19





 06:59 06:59 06:59 06:59


 


Intake Total   2133 120


 


Output Total   1895 145


 


Balance   238 -25


 


Weight   207 lb 1.6 oz 


 


Intake:    


 


  IV Fluids   1483 


 


  Medicated IV   200 


 


    potassium   200 


 


  Oral   450 120


 


Output:    


 


  [place]erman   1895 145














- Review of Systems


Constitutional Symptoms: 


   Negative: Weight Gain, Weight Loss, Night Sweats, Unexplained Falls


Dermatology: 


   Negative: Rash, Skin Lesions


Eyes: 


   Negative: Change in Vision, Double Vision


Thyroid: Positive: Palpitations


   Negative: Weight Loss, Weight Gain


Pulmonary: 


   Negative: Respiratory Distress, Shortness of Breath, Exercise Intolerance


Cardiology: Positive: Chest Pain


   Negative: Shortness of Breath, Palpitations, Swelling of Ankles, Peripheral 

Vascular Dis, Edema, Syncope, Claudication, Paroxysmal Nocturnal Dyspnea, 

Orthopnea


Gastroenterology: 


   Negative: Blood in Stools, Change in Bowel Habits, Haematemesis


Genital - Urinary: 


   Negative: Dysuria, Hematuria


Musculoskeletal: 


   Negative: Joint Pain, Joint Stiffness


Endocrinology: Positive: Obesity, Diabetes


   Negative: Polydipsia, Polyuria


Hematologic/Lymphatic: Positive: Use of Anticoagulant


   Negative: Use of Antiplatelet Drugs


Neurology: 


   Negative: Change in Speech, Change in Sphincter Function, Change in Walking


Psychiatry: 


   Negative: Unusual Anxiety, Suicidal Ideation


Allergic/Immunologic: 


   Negative: Hx HIV, Immunocompromise


Review of Systems Statement: 


All other review of systems negative, unless stated above.








Objective


Vital Signs:











Temp Pulse Resp BP Pulse Ox


 


 99.6 F   67   19   117/57   97 


 


 08/05/19 12:00  08/05/19 12:00  08/05/19 12:00  08/05/19 12:00  08/05/19 12:00











Oxygen Devices in Use Now: None


Appearance: nad, elderly, pleasant


Ears/Nose/Mouth/Throat: Clear Oropharnyx, Mucous Membranes Moist


Neck: NL Appearance and Movements; NL JVP, Trachea Midline


Respiratory: Symmetrical Chest Expansion and Respiratory Effort, Clear to 

Auscultation


Cardiovascular: NL Sounds; No Murmurs; No JVD, RRR, No Edema


Extremities: No Edema


Skin: No Rash or Ulcers


Neurological: Alert and Oriented x 3


Laboratory Results: 


 





 08/05/19 04:12 





 08/05/19 04:12 





 











INR (Anticoag Therapy)  1.32  (0.82-1.09)  H  08/05/19  04:12    


 


APTT  36.9 seconds (26.0-38.0)   08/04/19  12:58    


 


Total Bilirubin  0.30 mg/dL (0.2-1.0)   08/04/19  12:58    


 


AST  12 U/L (13-39)  L  08/04/19  13:43    


 


ALT  7 U/L (7-52)   08/04/19  12:58    


 


Alkaline Phosphatase  55 U/L ()   08/04/19  12:58    


 


Total Protein  6.4 g/dL (6.4-8.9)   08/04/19  12:58    


 


Albumin  3.5 g/dL (3.2-5.2)   08/04/19  12:58    


 


Globulin  2.9 g/dL (2-4)   08/04/19  12:58    


 


Albumin/Globulin Ratio  1.2  (1-3)   08/04/19  12:58    


 


Triglycerides  134 mg/dL  08/04/19  12:58    


 


Cholesterol  149 mg/dL  08/04/19  12:58    


 


LDL Cholesterol  87 mg/dL  08/04/19  12:58    


 


HDL Cholesterol  35.2 mg/dL  08/04/19  12:58    


 


TSH  2.69 mcIU/mL (0.34-5.60)   08/04/19  12:58    








 











  08/04/19 08/04/19 08/04/19





  12:58 16:01 19:40


 


Troponin I  0.05 H*  0.30 H*  0.89 H*














  08/04/19 08/05/19 08/05/19





  23:45 02:15 09:51


 


Troponin I  1.68 H*  1.71 H*  1.35 H*











8/2019


hgba1c 7.3


Diagnostic Imaging: 





Transthoracic Echocardiogram 8/5/2019


Conclusions


Summary:


- Procedure narrative: Transthoracic echocardiography was


  performed. The study was technically limited due to body habitus.


  Intravenous Definity 3 ml was administered for image enhancement.


- Left ventricle: Wall thickness is mildly increased. Systolic


  function is moderately reduced with proximal to mid LAD territory wall motion 

abnormalities The estimated ejection fraction


  is 35%.


- Right ventricle: The cavity size is normal. Pacer wire noted in


  the right ventricle. Systolic function is normal.


- Left atrium: The atrium is mildly dilated.


- Pulmonary arteries: Systolic pressure is within the normal range,


  estimated to be 32 mm Hg.


- No significant valvular abnormalities noted.





Recommendations:  Compared to prior study from 6/3/2019, the LVEF


is now significantly reduced and a new wall motion abnormality is


noted.








device interogation 7/2019:


21.7% AP, 3.1% 


33 seconds of mode switching





EKG Data: 





ekg 6/6/2019


AP-VS, non-specifiic mild st/t changes





ekg 8/4/2019 aftrernoon


AP-VS, PVC's, precordial st/t changes suggestive of myocardial ischemia





ekg 8/5/2019:


NSR, ST/T changes suggestive of proximal LAD ischemia, unchanged on repeat


borderline prolonged qtc








Assessment/Plan





Overall, I suspect a high risk type II MI given age and risk factors (stress 

cardiomyopathy is not entirely excluded).  He was asymptomatic up until the 

hypoglycemia and seizures.  I discussed at length with patient and daughters 

the risks, benefits, alternatives and uncertainties with invasive evaluation 

and treatment (i.e. cardiac catheterization with PCI or CABG) vs. medical 

treatment and observation with re-evaluation of EKG and an echocardiogram in a 

month in someone this age with this presentation.  For now, will d/c herman 

catheter and have patient ambulate in the ICU to see if he has symptoms. They 

would like to think about this and get a second opinion (I discussed with Dr. Chacon who has agreed to provide this).  For now, will hold eliquis being used 

for very low burden paroxysmal atrial fibrillation and continue aspirin and 

plavix along with other medications.  I do not appreciate the logic of doing a 

stress test in this situation at this time.  A dye allergy is noted.

## 2019-08-06 LAB
ALBUMIN SERPL BCG-MCNC: 3.3 G/DL (ref 3.2–5.2)
ALBUMIN/GLOB SERPL: 1.2 {RATIO} (ref 1–3)
ALP SERPL-CCNC: 63 U/L (ref 34–104)
ALT SERPL W P-5'-P-CCNC: 8 U/L (ref 7–52)
ANION GAP SERPL CALC-SCNC: 6 MMOL/L (ref 2–11)
AST SERPL-CCNC: 19 U/L (ref 13–39)
BASOPHILS # BLD AUTO: 0 10^3/UL (ref 0–0.2)
BUN SERPL-MCNC: 23 MG/DL (ref 6–24)
BUN/CREAT SERPL: 21.1 (ref 8–20)
CALCIUM SERPL-MCNC: 8.4 MG/DL (ref 8.6–10.3)
CHLORIDE SERPL-SCNC: 107 MMOL/L (ref 101–111)
EOSINOPHIL # BLD AUTO: 0.1 10^3/UL (ref 0–0.6)
GLOBULIN SER CALC-MCNC: 2.7 G/DL (ref 2–4)
GLUCOSE SERPL-MCNC: 229 MG/DL (ref 70–100)
HCO3 SERPL-SCNC: 22 MMOL/L (ref 22–32)
HCT VFR BLD AUTO: 30 % (ref 42–52)
HGB BLD-MCNC: 10.2 G/DL (ref 14–18)
LYMPHOCYTES # BLD AUTO: 1.2 10^3/UL (ref 1–4.8)
MAGNESIUM SERPL-MCNC: 1.9 MG/DL (ref 1.9–2.7)
MCH RBC QN AUTO: 30 PG (ref 27–31)
MCHC RBC AUTO-ENTMCNC: 34 G/DL (ref 31–36)
MCV RBC AUTO: 87 FL (ref 80–94)
MONOCYTES # BLD AUTO: 1 10^3/UL (ref 0–0.8)
NEUTROPHILS # BLD AUTO: 7.2 10^3/UL (ref 1.5–7.7)
NRBC # BLD AUTO: 0 10^3/UL
NRBC BLD QL AUTO: 0
PLATELET # BLD AUTO: 272 10^3/UL (ref 150–450)
POTASSIUM SERPL-SCNC: 4.4 MMOL/L (ref 3.5–5)
PROT SERPL-MCNC: 6 G/DL (ref 6.4–8.9)
RBC # BLD AUTO: 3.46 10^6 /UL (ref 4.18–5.48)
SODIUM SERPL-SCNC: 135 MMOL/L (ref 135–145)
TROPONIN I SERPL-MCNC: 1.04 NG/ML (ref ?–0.04)
WBC # BLD AUTO: 9.5 10^3/UL (ref 3.5–10.8)

## 2019-08-06 RX ADMIN — NYSTATIN SCH APPLIC: 100000 POWDER TOPICAL at 11:05

## 2019-08-06 RX ADMIN — ASPIRIN SCH MG: 81 TABLET, CHEWABLE ORAL at 07:53

## 2019-08-06 RX ADMIN — CLOPIDOGREL SCH MG: 75 TABLET, FILM COATED ORAL at 07:53

## 2019-08-06 RX ADMIN — TAMSULOSIN HYDROCHLORIDE SCH MG: 0.4 CAPSULE ORAL at 21:13

## 2019-08-06 RX ADMIN — ATORVASTATIN CALCIUM SCH MG: 40 TABLET, FILM COATED ORAL at 17:39

## 2019-08-06 RX ADMIN — LOSARTAN POTASSIUM SCH MG: 25 TABLET, FILM COATED ORAL at 07:53

## 2019-08-06 RX ADMIN — NYSTATIN SCH: 100000 POWDER TOPICAL at 14:03

## 2019-08-06 RX ADMIN — NYSTATIN SCH APPLIC: 100000 POWDER TOPICAL at 21:13

## 2019-08-06 RX ADMIN — METOPROLOL SUCCINATE SCH MG: 50 TABLET, EXTENDED RELEASE ORAL at 07:53

## 2019-08-06 NOTE — PN
Subjective


Date of Service: 08/06/19


Interval History: 





Patient today is feeling well. He walked around the unit today and did not 

experience SOB or chest pain; does not experience this at rest either. Denies 

abd pain, fever/chills, headache, visual changes.


Family History: Unchanged from Admission


Social History: Unchanged from Admission


Past Medical History: Unchanged from Admission





Objective


Active Medications: 








Acetaminophen (Tylenol Tab*)  650 mg PO Q4H PRN


   PRN Reason: FEVER/PAIN


Aspirin (Aspirin 81 Mg Chew Tab*)  81 mg PO DAILY [facility]


   Last Admin: 08/06/19 07:53 Dose:  81 mg


Atorvastatin Calcium (Lipitor*)  40 mg PO 1700 [facility]


   Last Admin: 08/06/19 17:39 Dose:  40 mg


Clopidogrel Bisulfate (Plavix Tab*)  75 mg PO DAILY [facility]


   Last Admin: 08/06/19 07:53 Dose:  75 mg


Insulin Glargine (Lantus(*))  5 units SUBCUT DAILY [facility]


Losartan Potassium (Cozaar Tab*)  25 mg PO DAILY [facility]


   Last Admin: 08/06/19 07:53 Dose:  25 mg


Metoprolol Succinate (Toprol Xl Tab*)  50 mg PO DAILY [facility]


   Last Admin: 08/06/19 07:53 Dose:  50 mg


Nitroglycerin (Nitroglycerin Tab 0.4 Mg*)  0.4 mg SL Q5M PRN


   PRN Reason: ANGINA


Nystatin (Nystatin Top Powder*)  1 applic TOPICAL TID [facility]


   Last Admin: 08/06/19 14:03 Dose:  Not Given


Tamsulosin HCl (Flomax Cap*)  0.4 mg PO BEDTIME [facility]


   Last Admin: 08/05/19 20:25 Dose:  0.4 mg








 Vital Signs - 8 hr











  08/06/19 08/06/19 08/06/19





  11:52 12:00 12:01


 


Temperature 98.5 F  


 


Pulse Rate   73


 


Respiratory  18 16





Rate   


 


Blood Pressure   120/63





(mmHg)   


 


O2 Sat by Pulse   97





Oximetry   














  08/06/19 08/06/19 08/06/19





  13:00 13:01 14:00


 


Temperature   


 


Pulse Rate  75 72


 


Respiratory 17 20 17





Rate   


 


Blood Pressure  126/59 146/70





(mmHg)   


 


O2 Sat by Pulse  98 99





Oximetry   














  08/06/19 08/06/19 08/06/19





  15:00 15:05 15:27


 


Temperature   98.2 F


 


Pulse Rate  73 


 


Respiratory 23 22 





Rate   


 


Blood Pressure  135/60 





(mmHg)   


 


O2 Sat by Pulse  99 





Oximetry   














  08/06/19 08/06/19 08/06/19





  16:00 17:00 18:00


 


Temperature   


 


Pulse Rate  70 72


 


Respiratory 21 20 23





Rate   


 


Blood Pressure  127/75 112/80





(mmHg)   


 


O2 Sat by Pulse  100 100





Oximetry   











Oxygen Devices in Use Now: None


Appearance: Elderly, white male sitting in chair apearing in NAD; daughters at 

bedside


Eyes: No Scleral Icterus, PERRLA


Ears/Nose/Mouth/Throat: Mucous Membranes Moist


Neck: NL Appearance and Movements; NL JVP


Respiratory: Symmetrical Chest Expansion and Respiratory Effort, Clear to 

Auscultation


Cardiovascular: NL Sounds; No Murmurs; No JVD, RRR


Abdominal: - - abd soft, nontender, nondistended


Extremities: No Edema, No Clubbing, Cyanosis


Skin: No Rash or Ulcers


Neurological: Alert and Oriented x 3, NL Muscle Strength and Tone


Result Diagrams: 


 08/06/19 04:45





 08/06/19 04:45


Microbiology and Other Data: 


 Microbiology











 08/04/19 18:26 Nasal Screen MRSA (PCR) - Final





 Nasal    Mrsa Not Detected














Assess/Plan/Problems-Billing


Assessment: 





86 yo white male with PMHx HTN, afib on AC, AV block with PPM, DMT2, BPH, and 

HLD presents with altered mental status and found to be hypoglycemic to 16.





- Patient Problems


(1) Hypoglycemia due to insulin


Current Visit: Yes   Status: Acute   Code(s): E16.0 - DRUG-INDUCED HYPOGLYCEMIA 

WITHOUT COMA; T38.3X5A - ADVERSE EFFECT OF INSULIN AND ORAL HYPOGLYCEMIC DRUGS, 

INIT   SNOMED Code(s): 757413041


   Comment: 


- Now resolved


- suspect this hypoglycemia is iatrogenic related to his current insulin doses


- restarting low dose lantus at 5U tomorrow AM


- continue to monitor fingerticks, now ACHS


   





(2) Elevated troponin


Current Visit: Yes   Status: Acute   Code(s): R74.8 - ABNORMAL LEVELS OF OTHER 

SERUM ENZYMES   SNOMED Code(s): 738903733


   Comment: 


-given that the patient has wall motion abnormalities, I would suspect this is 

due to a true NSTEMI; however Dr. Adams and Dr. Chacon believe this is likely 

to be a type II NSTEMI in the setting of hypotension, hypoglycemia, and seizure


- troponin peaked at 1.70 and has since trened down to 1.04


- Dr. Chacon evaluated patient today and ultimately the patient and cardiology 

has decided to procede with medical management


- received plavix load, continuing 75 mg


- continue metoprolol, ASA, and lipitor 


- appreciate further recommendations from cardiology


- will continue to monitor patient on telemetry floor and monitor for new 

cardiac events; repeating troponin and EKG in AM   





(3) Heart failure with reduced ejection fraction


Current Visit: Yes   Status: Acute   Code(s): I50.20 - UNSPECIFIED SYSTOLIC (

CONGESTIVE) HEART FAILURE   SNOMED Code(s): 254962428


   Comment: 


-last echo in June 2019 with normal EF


-EF on transthoracic echo now 35%


-due to LAD ischemic event or takotsubo


-cardiology recommends follow up echocardiogram in one month outpatient   





(4) Seizure


Current Visit: Yes   Status: Acute   Code(s): R56.9 - UNSPECIFIED CONVULSIONS   

SNOMED Code(s): 33907057


   Comment: 


 - Witnessed by EMS


 - likely releated to hypoglycemia


 - continue seizure precautions and monitor 





   





(5) Diabetes


Current Visit: No   Status: Acute   Code(s): E11.9 - TYPE 2 DIABETES MELLITUS 

WITHOUT COMPLICATIONS   SNOMED Code(s): 70594983


   Comment: 


 - changing fingersticks to ACHS given stability 


 - A1C 7.3 indicates good control with his age


 - it sounds patient has poor control of blood glucose at night, will restart 

lantus at low dose starting in AM, 5 U


 - patient would likely benefit from oral agent potentially in lieu of insulin


 - carb consistent diet   





(6) HTN (hypertension)


Current Visit: No   Status: Acute   Code(s): I10 - ESSENTIAL (PRIMARY) 

HYPERTENSION   SNOMED Code(s): 70731645


   Comment: 


 - continue Losartan, metoprolol   





(7) Paroxysmal A-fib


Current Visit: No   Status: Acute   Code(s): I48.0 - PAROXYSMAL ATRIAL 

FIBRILLATION   SNOMED Code(s): 600080757


   Comment: 


 - holding eliquis given use of dual antiplatelet   





(8) Full code status


Current Visit: Yes   Status: Acute   Code(s): Z78.9 - OTHER SPECIFIED HEALTH 

STATUS   SNOMED Code(s): 035335228


   





(9) DVT prophylaxis


Current Visit: No   Status: Acute   Code(s): Z29.9 - ENCOUNTER FOR PROPHYLACTIC 

MEASURES, UNSPECIFIED   SNOMED Code(s): 848725659


   Comment: 


-lovenox   


Status and Disposition: 





inpatient, likely will be safe for d/c tomorrow if troponin continue to 

downtrend and no new symptoms

## 2019-08-06 NOTE — CONS
INTERVENTIONAL CARDIOLOGY CONSULT NOTE:

 

DATE OF CONSULT:  08/06/19

 

CARDIOLOGIST:  Dr. Adams.

 

PRIMARY CARE PHYSICIAN:  At [facility].

 

HISTORY OF PRESENT ILLNESS:  An 85-year-old male, nursing home resident, admitted with symptomatic hy
poglycemia, subsequently found to have an anterior non-ST elevation infarct by EKG and troponins.  He
 by history had an infarct some 50 years ago.  He has not had any cardiac issues since.  They believe
 his last stress test was about 9 years ago.  [facility] as far back as 2011 has no documentation of emerson
ging.  He has been in [facility] for the past year and a half because of inability to administer his ow
n insulin.  At [facility], he is moderately active, he does push patients around in wheelchairs, he rid
es an exercise bike, he has not had any chest pain or exertional dyspnea.  He has been on Pradaxa and
 then Eliquis for the past 1 to 2 years for paroxysmal atrial fibrillation, there is no history of bl
eeding.  He was not on aspirin.  His daughters believe he was on a statin in the past, but has not be
en on one for several years.

 

Even in retrospect, he has not had any change in exercise tolerance, shortness of breath, or chest di
scomfort.

 

PAST MEDICAL HISTORY:

1.  Hypertension.

2.  Paroxysmal atrial fibrillation.

3.  Diabetes type 2.

4.  Permanent pacemaker for sick sinus syndrome.

 

ALLERGIES:  BACITRACIN, CONTRAST, NEOMYCIN, and others.

 

SOCIAL HISTORY:  He lives at [facility].  Remote smoker.  He is a .

 

PHYSICAL EXAM:  He is an elderly male who is hard of hearing but articulate.  He is slightly forgetfu
l.  His ambulation with a walker is quite good, but he moves fairly slowly, consistent with moderate 
debility.  His blood pressure is 141/67, heart rate in the 60s.  His lungs are clear to percussion an
d auscultation.  JVP is normal.  Carotids palpable, no bruits.  HEENT:  Without xanthelasma, scleral 
injection, or jaundice.  Cardiac exam notable for regular rhythm, no gallop or murmur.  I cannot feel
 the apical impulse.  Abdomen without bruit.  His radial pulses are palpable but diminished, pedals a
re palpable but diminished.  He has at most trace edema.

 

DIAGNOSTIC STUDIES/LAB DATA:  His troponin peaked at 1.71, BUN 23, creatinine 1.09, GFR 64.  Choleste
rol 149, triglycerides 134, LDL 87, HDL 35.2; on no statin.

 

EKG since admission has evolved anterolateral T-wave inversion.  I reviewed his echocardiogram which 
is consistent with an LAD distribution ischemic event or Takotsubo.  His TIMOTHY score is 2 to 3, his GR
ACE score is high.  His PRECISE-DAPT score is high for bleeding.

 

IMPRESSION:  Non-ST elevation infarct.  Given the circumstances with severe hypoglycemia and a seizur
e, this certainly could be a type 2 infarct.  His TIMOTHY score is intermediate, he is high risk for ble
eding with dual antiplatelet therapy. He has moderate debility.  We discussed the benefit of stenting
 for intermediate risk acute coronary syndrome, primarily to prevent readmission with acute coronary 
syndrome.  Given the clinical circumstances, I recommended adding a statin and continuing medical man
agement with close followup.  If he has any recurrence of ischemia, he would certainly be a candidate
 for cath and revascularization recognizing his high bleeding risk.

 

Thanks for the consultation.  He will continue to be followed by the noninterventional cardiology shivam
up.

 

 [id number]/[facility] #: [id number]

## 2019-08-07 VITALS — SYSTOLIC BLOOD PRESSURE: 137 MMHG | DIASTOLIC BLOOD PRESSURE: 82 MMHG

## 2019-08-07 LAB
ANION GAP SERPL CALC-SCNC: 4 MMOL/L (ref 2–11)
BUN SERPL-MCNC: 20 MG/DL (ref 6–24)
BUN/CREAT SERPL: 19.6 (ref 8–20)
CALCIUM SERPL-MCNC: 8.3 MG/DL (ref 8.6–10.3)
CHLORIDE SERPL-SCNC: 108 MMOL/L (ref 101–111)
GLUCOSE SERPL-MCNC: 204 MG/DL (ref 70–100)
HCO3 SERPL-SCNC: 24 MMOL/L (ref 22–32)
POTASSIUM SERPL-SCNC: 4.2 MMOL/L (ref 3.5–5)
SODIUM SERPL-SCNC: 136 MMOL/L (ref 135–145)
TROPONIN I SERPL-MCNC: 0.83 NG/ML (ref ?–0.04)

## 2019-08-07 RX ADMIN — CLOPIDOGREL SCH MG: 75 TABLET, FILM COATED ORAL at 09:35

## 2019-08-07 RX ADMIN — METOPROLOL SUCCINATE SCH MG: 50 TABLET, EXTENDED RELEASE ORAL at 09:34

## 2019-08-07 RX ADMIN — LOSARTAN POTASSIUM SCH MG: 25 TABLET, FILM COATED ORAL at 09:35

## 2019-08-07 RX ADMIN — ASPIRIN SCH MG: 81 TABLET, CHEWABLE ORAL at 09:35

## 2019-08-07 RX ADMIN — NYSTATIN SCH APPLIC: 100000 POWDER TOPICAL at 11:29

## 2019-08-07 NOTE — DS
CC:  Dr. Derian Adams *

 

DISCHARGE SUMMARY:

 

DATE OF ADMISSION:  08/04/19

 

DATE OF DISCHARGE:  08/07/19

 

ATTENDING PHYSICIAN WHILE IN THE HOSPITAL:  Dr. Margaret Bledsoe * (dictated by 
ANA Saucedo).

 

PRIMARY CARE PROVIDER:  Physician at [facility].

 

CONSULTING CARDIOLOGIST:  Dr. Derian Adams.

 

PRIMARY DIAGNOSES:

1.  Hypoglycemia secondary to insulin use.

2.  Non-ST-elevation myocardial infarction.

3.  Diastolic heart failure, likely secondary to acute coronary syndrome.

4.  Seizure secondary to hypoglycemia.

 

SECONDARY DIAGNOSES:

1.  Atrioventricular block, status post pacemaker in June 2019.

2.  History of atrial fibrillation, on anticoagulation.

3.  Hypertension.

4.  Hyperlipidemia.

5.  Benign prostatic hypertrophy.

6.  Hard of hearing.

7.  Type 2 diabetes.

 

STUDIES WHILE IN THE HOSPITAL:  Transthoracic echocardiogram on 08/05/19: 
Estimated ejection fraction of 35% with extensive proximal to mid left anterior 
descending coronary territory wall motion abnormalities.  Regional wall motion 
abnormalities:  Akinesis of the apical myocardium; severe hypokinesis of the 
mid apical anterior, mid inferoseptal, and apical septal myocardium; moderate 
hypokinesis of the mid anteroseptal myocardium; mild hypokinesis of the basal 
anteroseptal, apical inferior, and apical lateral myocardium; cannot exclude 
the basal anterior myocardium.

 

EKG on 08/07/19:  T-wave inversions in leads II, II, aVF, V2, V3, V4, V5, V6 
and T- wave flattening in V1 which is unchanged from previous EKG on the day 
prior.

 

PERTINENT LAB DATA:  Troponin:  Peak troponin on 08/05/19 is 1.71; date of 
discharge, troponin 0.83.  Hemoglobin A1c was 7.3.

 

HISTORY OF PRESENT ILLNESS/HOSPITAL COURSE:  Donovan Pelaez is an 85-year-old 
white male with past medical history significant for diabetes mellitus type 2; 
AV block, status post pacemaker; history of atrial fibrillation; hypertension; 
hyperlipidemia who presented to the emergency department from [facility] due to confusion and hypoglycemia on 08/04/19.  Please see history and 
physical dictated by Floridalma Chaudhry NP, for further details.  In brief, the 
patient reportedly had a blood sugar of 102 and was given 4 units of lispro and 
24 units of Lantus that morning and then later had confusion, followed by 
seizure with clonus in the leg.  Ultimately, the patient had no further 
seizures during his hospital stay and this isolated seizure was thought to be 
due to his severe hypoglycemia.  The patient did have evidence of an NSTEMI 
given that he had an elevated troponin and EKG changes.  Ultimately, he was 
evaluated by cardiology service and determined that medical management was best 
as he was very high risk for cardiac catheterization.  His home Eliquis was 
held and he was started on dual antiplatelet therapy as well as statin therapy.
  He did not have further symptoms or further ischemic events and his troponin 
downtrended.  He was found to have a new heart failure secondary to his ACS, 
although takotsubo cannot be ruled out based on transthoracic echocardiogram 
performed during this hospitalization.  Additionally during his hospital stay, 
all insulin therapy was held and his glucose remained in the low 200s during 
the majority of his hospital stay, though once was elevated to 306 while using 
a carb consistent diet.  Ultimately on the date of discharge, he was given 5 
units of Lantus.  The patient did not have cardiac symptoms at rest or with 
exertion during his hospital stay.  On the day of discharge, the patient is 
feeling well.  He denies chest pain, difficulty breathing, abdominal pain, 
nausea, vomiting, fever, or chills.

 

PHYSICAL EXAMINATION:  General:  An obese, white elderly male, sitting in 
hospital chair, appearing comfortable, in no acute distress.  Head:  
Normocephalic, atraumatic.  Eyes:  PERRL.  Sclerae anicteric.  ENT:  Mucous 
membranes moist. Lungs:  Clear to auscultation throughout.  Cardio:  Regular 
rate and rhythm without murmurs, rubs, or gallops.  Abdomen:  Abdomen is soft, 
nontender, nondistended. Extremities:  No clubbing, cyanosis, or edema.  There 
is ecchymosis to the left dorsum of the hand with approximately 3 cm bullous 
which is secondary to IV administration via EMS.  Neuro:  The patient is alert 
and oriented x3.  No focal deficits.  Able to move all extremities.  Skin:  
Skin is warm, dry, and intact.

 

DISCHARGE PLAN:  

Diet:  Carbohydrate consistent diet.

 

Activity:  Return to normal activity as tolerated.

 

Given that the patient is hospitalized due to severe hypoglycemia and his 
hemoglobin A1c is actually well controlled and given his age, I recommend a 
great decrease in his diabetes medications.  Given that the patient had blood 
glucose within the 200 range during the majority of his stay even without any 
insulin being given, this represents a good control given the age of 85 years 
old. I do acknowledge that the patient eats with a different pattern in the 
hospital compared to at home at [place]. I am recommending using 5 units of 
long-acting insulin daily in the morning.  The patient would likely benefit 
from having oral medication such as sitagliptin or metformin  to further 
control his diabetes, and insulin may not even be needed.  Range of blood 
glucose in the low 200s to mid 200s is safe for his age and the risk of 
hypoglycemia on insulin is much more severe.

 

The patient should follow up with cardiology service within 1 month to have a 
repeat echocardiogram and to follow up.  The patient should report to the 
emergency department if he is again experiencing severe hypoglycemia or if he 
experiences chest pain, shortness of breath, loss of consciousness, or any 
additional seizure.

 

DISCHARGE MEDICATIONS:  

New home medications:

1.  Aspirin 81 mg p.o. daily.

2.  Plavix 75 mg p.o. daily.

3.  Atorvastatin 40 mg p.o. daily.

4.  Nitroglycerin 0.4 mg sublingual q.5 minutes p.r.n. angina.

5.  Insulin glargine 5 units q.a.m. subcutaneous.

6.  Lasix 40 mg p.o. daily.

 

Discontinued medications:

1.  24 units of Lantus subcu daily.

2.  Lispro 4 units subcu b.i.d.

3.  Eliquis 5 mg p.o. b.i.d.

 

Continued home medications:

1.  Senna 2 tabs p.o. b.i.d.

2.  Metoprolol succinate 50 mg p.o. daily.

3.  Tamsulosin 0.4 mg p.o. at bedtime.

4.  Losartan 25 mg p.o. daily.

 

CONDITION ON DISCHARGE:  Stable.

 

DISPOSITION:  [facility]. 



TIME SPENT:  Approximately 50 minutes was spent on this discharge, 
approximately half this time was spent at bedside.

 

____________________________________ ANA SAUCEDO

 

[id number] #: [id number]

MTDD

## 2021-05-19 NOTE — ED
Neurological HPI





- HPI Summary


HPI Summary: 


Code Grey called by EMS at 12:26 with ETA 7 minutes. Pt was given Versed 5 mg 

IV at 12:28 for seizure witnessed by EMS.


MD sign up for pt in computer not until 13:12, however MD saw pt as soon as he 

arrived at 12:33. Preliminary NIH 5





LEVEL 5 CAVEAT - VERBALLY UNRESPONSIVE, probable post-ictal, and medicated with 

Versed 





Pt is verbally unresponsive and EMS reports:


This patient is a 85 year old M presenting to [facility] from nursing home by EMS 

with a chief complaint of seizures at 11:50am. The seizure was witnessed by EMS 

and his last known well was 11:47 reported by nursing home. Pt is on Eliquis. 

Pt is from [facility], and they report slurred speech and confusion. Staff 

reported that he is normally alert and oriented. Pt had right arm weakness per 

NH staff. Pt also had a seizure while with EMS that was full body, tonic clonic 

involving arms and legs with no incontinence. When EMS first arrived pt could 

answer some questions, I feel like I am dying. He has no known Hx of seizure, 

or strokes. 





Pt has Hx of diabetes, HTN, atrial fibrillation. Pt was on Xarelto, and on 7/30/ 19 started Eliquis 5 mg BID, and his last dose was at 09:40. Pt took Lispro 4 

units at 06:40, and Lantus 24 units at 09:42. On 6/6/19 pt had a dual chamber 

pacemaker placed for high degree AV block. 





Notified son, Billy ([phone]) at 12:43 and 13:05 and left message and 

voicemail both times.





Pt is on 15L O2.





Glucose of 16 noted upon arrival in ED, given 50 ml of D50 dextrose and IV of 

D5w started at 100 ml/hour. Prior glucose by NH and EMS was not reported.





At 13:03, glucose is 170.


Vital signs while in room: HR 80 bpm, /57, O2 sat 99%





 Home Medications











 Medication  Instructions  Recorded  Confirmed  Type


 


Losartan TAB* [Cozaar TAB*] 25 mg PO DAILY  tab 11/17/17 06/02/19 Rx


 


Tamsulosin CAP* [Flomax CAP*] 0.4 mg PO BEDTIME  cap 11/17/17 06/02/19 Rx


 


Insulin GLARGINE(*) [Lantus(*)] 24 units SUBCUT DAILY 06/02/19 06/02/19 History


 


Insulin Lispro [Insulin Lispro 4 unit SQ BID 06/02/19 06/02/19 History





Kwikpen U-100]    


 


Sennosides/Docusate Sodium [Senna 2 each PO BID 06/02/19 06/02/19 History





Plus Tablet]    


 


Metoprolol Succinate XL TAB* 50 mg PO DAILY #30 tab.xl 06/05/19  Rx





[Toprol XL TAB*]    


 


Furosemide 40 mg PO DAILY #0 06/06/19 06/02/19 Rx


 


Apixaban* [Eliquis*] 5 mg PO BID 08/04/19 08/04/19 History























- History of Current Complaint


Stated Complaint: possible code gray


Hx Obtained From: EMS


Hx From Patient Unobtainable Due To: Altered Mental Status - Verbally 

unresponsive


Onset/Duration: Sudden Onset, Started hours ago - 11:47am is LKW, Still Present


Timing: Constant


Onset Severity: Severe - no pain, unresponsive


Current Severity: Severe - no pain, verbally unresponsiveness is severe


Seizure Severity: Self Limited


Number of Seizures: 2 - both witnessed, 1 by NH, 1 by EMS


Neurological Deficit Location: Generalized


Pain Intensity: 0 - pt verbally unresponsive 


Pain Scale Used: 0-10 Numeric


Character: Impaired Speech, Confusion, Lethargy, Responsiveness - decreased, 

Other: - seizures x 2, witnessed


Seizure Character: Generalized, Total-Clonic


Aggravating: Nothing - later found to be profoundly hypoglycemic with FS 

glucose of 16.


Alleviating: Glucose - found after admitted to [facility] ED and after Code Henley 

called and CT done


Associated Signs and Symptoms: Positive: Confusion, Seizure, Impaired Speech


TPA Considered: No - pt hypoglycemic 


Related Hx: Seizure





- Additional Pertinent History


Primary Care Physician: [id number]





- Allergy/Home Medications


Allergies/Adverse Reactions: 


 Allergies











Allergy/AdvReac Type Severity Reaction Status Date / Time


 


bacitracin Allergy  Hives Verified 06/02/19 05:18





[From Neosporin     





([id number])]     


 


Iodinated Contrast Media Allergy  Hives Verified 06/02/19 05:18





[Iodinated Contrast- Oral     





and IV Dye]     


 


neomycin Allergy  Hives Verified 06/02/19 05:18





[From Neosporin     





([id number])]     


 


polymyxin B Allergy  Hives Verified 06/02/19 05:18





[From Neosporin     





([id number])]     


 


hydrochlorothiazide AdvReac  See Comment Verified 06/02/19 05:18





[From Maxzide]     


 


triamterene [From Maxzide] AdvReac  See Comment Verified 06/02/19 05:18














PMH/Surg Hx/FS Hx/Imm Hx


Previously Healthy: No - LEVEL 5 CAVEAT - UNRESPONSIVE


Endocrine/Hematology History: Reports: Hx Anticoagulant Therapy - Xarelto-

changed recently to Eliquis , Hx Diabetes - Type II on insulin 


Cardiovascular History: Reports: Hx Hypertension - ON MEDS


   Denies: Other Cardiovascular Problems/Disorders


Respiratory History: 


   Denies: Other Respiratory Problems/Disorders


GI History: 


   Denies: Other GI Disorders


 History: Reports: Hx Benign Prostatic Hyperplasia - s/p TURP


Musculoskeletal History: 


   Denies: Other Musculoskeletal History


Sensory History: Reports: Hx Cataracts, Hx Deafness, Hx Hearing Aid


   Denies: Hx Contacts or Glasses


Opthamlomology History: Reports: Hx Cataracts


   Denies: Hx Contacts or Glasses


Neurological History: 


   Denies: Other Neuro Impairments/Disorders





- Surgical History


Surgery Procedure, Year, and Place: PROSTATE 2011, [facility].  2003, LASER SURGERY 

[facility], [place].  2002, BENIGN TUMOR FROM BACK, [place].  2013, RIGHT CATARACT

, [facility]


Hx Anesthesia Reactions: No





- Family History


Known Family History: Positive: Hypertension, Diabetes





- Social History


Lives: At The Nursing Home


Alcohol Use: None


Substance Use Type: Reports: None


Hx Tobacco Use: No


Smoking Status (MU): Never Smoked Tobacco





Review of Systems


Constitutional: Negative - negative fever


Gastrointestinal: Negative - no vomiting or diarrhea 


Positive: Bruising


Neurological: Other - pos - seizure, confusion


Positive: Weakness - right arm, Slurred Speech


All Other Systems Reviewed And Are Negative: No





- Comments


Additional Review of Systems Comments: 





LEVEL 5 CAVEAT - UNRESPONSIVE





Physical Exam





- Summary


Physical Exam Summary: 





LEVEL 5 CAVEAT - UNRESPONSIVE





Appearance: Ill-appearing, no pain distress noted, obese


Skin: Warm, color reflects adequate perfusion, dry, bandaged wound left hand 

with swelling left hand (per EMS wound from seizure),  Ecchymosis on dorsal hand

, skin tear on right upper posterior arm, ecchymosis on right shoulder 


Head: Mouth open, snoring respiration, no evidence of injury


Eyes: eyes closed


ENT: dry mucosa 


Neck: trachea midline, no swelling or JVD noted


Respiratory: Course rhonchi throughout, no accessory muscles of respiration, no 

respiratory distress


Cardio: RRR, No murmur, pulses normal, brisk capillary refill, pacemaker in 

left upper chest. 


Abdomen: Soft, nontender, scar from umbilicus to suprapubic area


Bowel sounds: Present 


Musculoskeletal: No edema, bruising and skin tears as above, no bony 

deformities.


Psychological: Normal


Neuro: no response to verbal stimuli, with tactile stimuli he winces, moves 

left arm, left leg, and grimaces, no spontaneous movement of his right arm or 

right leg. 











Triage Information Reviewed: Yes


Vital Signs On Initial Exam: 


 Initial Vital Signs











Pulse  87   08/04/19 12:49


 


Resp  21   08/04/19 12:49


 


Pulse Ox  82   08/04/19 12:49














Vital Signs Reviewed: Yes





- Schriever Coma Scale


Best Eye Response: 1 - None


Best Motor Response: 4 - Withdraws


Best Verbal Response: 3 - Inappropriate Words


Coma Scale Total: 8





Diagnostics





- Laboratory


Result Diagrams: 


 08/06/19 04:45





 08/07/19 04:43


Lab Statement: Any lab studies that have been ordered have been reviewed, and 

results considered in the medical decision making process.





- CT


  ** Brain CT


CT Interpretation Completed By: Radiologist


Summary of CT Findings: Brain CT reveals, per radiologist,.  IMPRESSION:  1.  

No acute intracranial abnormality. MRI is more sensitive for acute infarct.  2.

  Mild cerebral volume loss.  3.  Mild chronic small vessel scanner disease is 

likely.  .  ED physician has reviewed this radiology report.





- EKG


  ** 1305


Cardiac Rate: NL - 76 bpm


EKG Rhythm: Sinus Rhythm


ST Segment: Non-Specific


Ectopy: None


EKG Comparison: Other - Compared with 6/6/19, pt now in sinus rhythm.


Summary of EKG Findings: EKG at 13:05 reveals sinus rhythm 76 bpm, 1st degree 

AV block (296), nml IVCT, nml QTc. Compared with EKG on 6/6/19, pt is now in 

sinus rhythm. ED MD has reviewed and interpreted this EKG.





  ** 1329


Cardiac Rate: Other Rate - junctional ventricular bigeminy 87 bpm


ST Segment: Non-Specific


Ectopy: PVCs - bigeminy


EKG Comparison: Other - compared to previous EKG at 13:05 there is a changed 

rhythm


Summary of EKG Findings: EKG at 1329 reveal junctional ventricular bigeminy 87, 

nml IVCT, nml QTc , compared to previous EKG at 13:05 there is a changed 

rhythm. ED MD has reviewed and interpreted this EKG.





NIH Scale





- NIH Scale


Level of Consciousness: Repeated or Painful Stimulation


Ask Patient the Month and His/Her Age: Neither Correct/Aphasic


Ask Pt to Open/Close Eyes and /Release Non-Paretic Hand: Neither Correctly


Best Gaze (Only Horizontal Eye Movement): Normal


Visual Field Testing: No Visual Loss


Facial Paresis-Pt to Smile & Close Eyes or Grimace Symmetry: Normal/Symmetrical


Motor Function - Right Arm: No Effort Against Gravity


Motor Function - Left Arm: No Drift-Holds 10 Seconds


Motor Function - Right Leg: No Effort Against Gravity


Motor Function - Left Leg: No Drift-Holds 10 Seconds


Limb Ataxia-Must be out of Proportion to Weakness Present: Absent


Sensory (Use Pinprick to Test Arms/Legs/Trunk/Face): Normal


Best Language (Describe Picture, Name Items): Mute/Global Aphasia


Dysarthria (Read Several Words): Unintelligible or Mute


Extinction and Inattention: No Abnormality - tests that pt is unable to perform 

such as pinprick are marked as normal, although really they are not able to be 

tested.


Total Score: 17





Re-Evaluation





- Re-Evaluation


  ** First Eval


Re-Evaluation Time: 13:14


Change: Improved


Comment: Pt is moving right lower extremity and rigth upper extremity





  ** Second Eval


Re-Evaluation Time: 13:28


Change: Worse


Comment: Nurse Copeland reports heart rate dropped to 30s with /62, second 

EKG ordered. PE upon re-eval : diminished pulses throughout.





  ** Third Eval


Re-Evaluation Time: 13:57 - Pt with continued hypoglycemia despite large 

amounts IV glucose given 


Change: Worse


Comment: Nurse Copeland reports pt glucose at 68 bpm, and MD ordered 50 ml of 

D50 dextrose.





Course/Dx





- Course


Course Of Treatment: 86 yo M from [facility] brought in with hx witnessed 

seizures x 2, given Versed 5mg by EMS, called in as a Code Gray prior to 

arrival in the ED with a LKW of 11:47am for right sided weakness, slurred 

speech and confusion. Pt is not a TPA candidate as he is on Eliquis for afib. 

CT brain is neg for acute findings. After CT brain, pt was found to have FS 

glucose of 16 and Code Gray was cancelled.  He was treated with i amp D 50 and 

then D5W at 100ml/hr was hung.  Pt had been given his insulin by the NH despite 

FS glucose not reported by the NH, so pt had continued ongoing hypoglycemia, 

despite IV glucose replacement.  His hypoglycemia was felt to be responsible 

for his altered mental status, his neurologic findings and his seizures.  Pt 

was admitted to continue to follow and treat pt's hypoglycemia, and any of 

possible seizures or neurologic abnormalities.  LEVEL 5 CAVEAT - UNRESPONSIVE.  

GCS upon arrival is 8.  EKG at 13:05 reveals sinus rhythm 76 bpm, 1st degree AV 

block (296), nml IV CT, nml QTc. Compares with EKG on 6/6/19, pt is now in 

sinus rhythm.  EKG at 1329 reveal junctional ventricular bigeminy 87, nml IV CT

, nml QCt , compared to previous EKG at 13:05 there is a changed rhythm.  Brain 

CT reveals, per radiologist,.  IMPRESSION:  1.  No acute intracranial 

abnormality. MRI is more sensitive for acute infarct.  2.  Mild cerebral volume 

loss.  3.  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  Pt medications 

reviewed this visit. Nurses notes reviewed.  Allergies noted.  Blood work 

obtained. Hgb is 11.2, Hct is 33, INR is 1.34, BUN is 26, Creatinine is 1.122, 

Glucose is 188 after D50 and D5W hanging, POC Glucose is 16 at 12:49, and 170 

at 13:02, Lactic Acid is 2.5, Calcium is 8.5, Troponin is 0.05.  In the ED 

course the patient was given fluids, 2x 50 ml of D50 dextrose,  and IV of D5W 

started at 100 ml/hour. GCS at 1542 is 14, as glucose rises and pt becomes more 

responsive.  Discussed pt case with Dr. Lorenz, Dr. Lorenz accepts pt for 

admission.  Patient will be admitted. The patient's family are agreeable with 

this plan.





- Differential Dx


Differential Diagnoses Neuro: Positive: Cerebrovascular Accident, Hypoglycemia, 

Insulin Rx, Intracranial Bleed, Medication Reaction, Metabolic Abnormality, 

Overdose, Toxic Exposure, Transient Ischemic Attack





- Diagnoses


Provider Diagnoses: 


 Hypoglycemia due to type 2 diabetes mellitus, Altered mental status, Hx of 

cardiac pacemaker





During the Visit The Following Alert/Code Occurred: Code Grey - 12:25





- Physician Notifications


Discussed Care Of Patient With: [place]


Time Discussed With Above Provider: 12:44


Instructed by Provider To: Other - 12:44- spoke to [place] about potential 

Code Grey;  12:53- Spoke to Dr. Fuentes canceled Code Santoro; 12:54  radiologist, 

Dr. Jane reported Brain CT results; 13:47 - Dr. Adams, cardiology states 

[employer] pacer does not to be interrogated at this time, recommends keep 

potassium greater than 4.0 and he agrees with admission; 14:00  Discussed pt 

case with Dr. Lorenz, Dr. Lorenz accepts pt for admission.





- Critical Care Time


Critical Care Time:  min - 75 minutes





Discharge ED





- Sign-Out/Discharge


Documenting (check all that apply): Patient Departure - Admit


All imaging exams completed and their final reports reviewed: Yes


Patient Received Moderate/Deep Sedation with Procedure: No





- Discharge Plan


Condition: Stable


Disposition: ADMITTED TO [facility]





- Billing Disposition and Condition


Condition: STABLE


Disposition: Admitted to [facility]





- Attestation Statements


Document Initiated by Scribe: Yes


Documenting Scribe: Asha Andrade


Provider For Whom Naa is Documenting (Include Credential): Dr. Kanwal Breen MD


Scribe Attestation: 


Asha PATRICK scribed for Dr. Kanwal Breen MD on 08/26/19 at 2341. 


Scribe Documentation Reviewed: Yes


Provider Attestation: 


The documentation as recorded by the Asha holt accurately 

reflects the service I personally performed and the decisions made by me, Dr. Kanwal Breen MD


Status of Scribe Document: Viewed
No

## 2021-10-04 NOTE — CONS
CARDIOLOGY CONSULTATION:

 

DATE OF CONSULT:  19

 

REASON FOR EVALUATION:  Syncope, bradycardia.

 

HISTORY OF PRESENT ILLNESS:  This is a very pleasant 85-year-old gentleman.  
The history was obtained from him, his family at the bedside, and from Dr. Gordon 
and from her note of earlier today.  He is an 85-year-old with multiple medical 
problems and it sounds as though he may have a history of sick sinus syndrome.  
He apparently was seen by Dr. Doty prior to surgery, although his records 
are not available.  There is an EKG from 2013, which revealed sinus 
bradycardia in the 40s with first-degree AV block and inferolateral ST 
depressions.  There is also a discharge summary from 2017, which mentioned that 
his beta-blocker was being stopped because of bradycardia; however, he somehow 
was restarted on his beta- blocker and has been on Xarelto for unclear 
indications, presumably paroxysmal atrial fibrillation, though that was not 
documented anywhere.  He had trouble managing his diabetes and was admitted to 
[facility] with improvement in control of his diabetes.  He does say 
he had a syncopal episode a couple of years ago when his glucoses were too low.
  He was in his usual state of health, which includes walking around [place] 
and riding an exercise bike for half hour 3 times a week.  He said that they 
have been cutting back on his portions and snacks at night.  He said last night 
he went to bed around 12:30 and apparently was found on the floor later that 
morning unresponsive.  He was also found to have a sinus bradycardia in the 30 
at the nursing home.  The patient and family said it was unwitnessed, but 
according to the ER records, it says the patient stood up and had a syncopal 
episode in front of a nurse, who gave him glucagon.  He had a heart rate of 30 
at the nursing home and 40 in the ambulance.  I do not see a documented blood 
sugar except for 219 at 3:10 a.m., but that was after glucagon. (addendum: the 
[place] notes record a glucose of 54 prior to rx. [facility] 6.2.19 8;30 pm)  He 
denies any chest pain, syncope, or near syncope.  No orthopnea or PND.  He does 
have some dependent edema.  He reports that normally he can walk around despite 
his neuropathy and functions fairly well as attested to by his family.  He is 
hard of hearing and did not have his hearing aids at the time of the interview.
  He does not recall the reason he was on Xarelto or whether he had a 
tachybrady syndrome. He did have another EKG in 2015, which revealed sinus 
bradycardia at 46.  A Holter monitor was ordered in 2017, but was not performed 
as far as I can tell.

 

PAST MEDICAL HISTORY:  Includes diabetes, hypertension, diabetic neuropathy, BPH
, proteinuria secondary to diabetes, hyperlipidemia, tonsillectomy, cyst 
removed from his back, history of TURP, umbilical hernia repair, hyponatremia 
due to hydrochlorothiazide, hypoglycemia, and anticoagulation for unclear 
reasons, possibly due to atrial fibrillation, although I do not see that 
documented.

 

PAST SURGICAL HISTORY:  Includes TURP and cataract surgery.

 

MEDICATIONS:  His medications as an outpatient include:

1.  Flomax 0.4 mg at bedtime.

2.  Losartan 25 mg a day.

3.  Aspirin 81 mg a day.

4.  Glargine 24 units daily.

5.  Xarelto 20 mg a day.

6.  Senna 2 tabs b.i.d.

7.  Metoprolol tartrate 25 mg b.i.d.

8.  Furosemide 60 mg a day.

9.  Insulin glargine 20 units at bedtime.

10.  Insulin lispro 4 units b.i.d.

 

ALLERGIES:  Include BACITRACIN, IODINATED CONTRAST, and NEOMYCIN.

 

FAMILY HISTORY:  He had 4 and 2 sisters, 1 brother  of diabetes and 1 
brother  in his 70s of an MI and alcoholism.

 

SOCIAL HISTORY:  He has a history of tobacco use, discontinued at age 50.  He 
denies emphysema or asthma.  He has a history of alcohol use that was 
discontinued 2 years ago when he went to [place].  He drinks 2 to 3 cups of 
caffeinated coffee a day.  He is a retired  from [employer].  He is 
.

 

REVIEW OF SYSTEMS:  His review of systems x10 was negative except as above.  He 
denies hematemesis or hematochezia.  He is accompanied by 2 of his daughters.

 

PHYSICAL EXAM:  He is a well-developed, well-nourished gentleman, in no 
apparent distress.  His blood pressure is elevated at 189 over approximately 87
, pulse in the 70s.  Atraumatic, normocephalic.  Extraocular muscles intact.  
Sclerae anicteric.  Arcus senilis.  No cervical adenopathy or thyromegaly.  
Carotids 2+ without bruits.  Cardiac Exam:  S1, S2, somewhat distant.  No 
murmurs, gallops or rubs.  Chest:  Decreased breath sounds, prolonged 
expiratory phase.  No CVAT. Abdomen:  Obese.  Bowel sounds present.  Possible 
enlarged abdominal aorta based on the exam.  Femoral pulses intact without 
bruits.  Distal pulses were diminished. No significant edema.  There were 
chronic venous stasis changes.  Motor strength 5/5 bilaterally.  Deep tendon 
reflexes 2/4.  Alert and oriented to place, person, month, and year.  He was 
hard of hearing.

 

DIAGNOSTIC STUDIES/LAB DATA:  Labs include a sodium of 136, potassium of 3.6, 
BUN elevated at 33, creatinine of 1.08, BUN-to-creatinine ratio elevated, 
magnesium low at 1.8, calcium low at 8.3.  His troponin of 0 at 3 a.m.

 

Brain CT revealed cerebral atrophy.  No significant change from the previous.

 

EKG revealed sinus bradycardia with first-degree AV block and sinus pauses, 
minor inferolateral ST depressions.  His EKG from 2017 revealed sinus 
rhythm at 79 with first-degree AV block and more pronounced lateral ST changes.

 

His chest x-ray revealed no active cardiopulmonary disease.

 

He was anemic.  His TSH was normal.  CBC revealed white count elevated at 14.2, 
hemoglobin 9.8, hematocrit of 29, and a platelet count of 234.

 

IMPRESSION AND PLAN:  My impression is that Mr. Pelaez had a syncopal episode 
of unclear etiology.  Possibilities are numerous and include dehydration based 
on his numbers and diuretic therapy as well as a new anemia which is more 
pronounced and bradycardia, hypoglycemia or tachyarrhythmia.  He has a history 
of bradycardia in the past and that may have been exacerbated by his use of 
metoprolol and his advanced age.  For the time being, I would recommend the 
followin.  I would replace his potassium and try to maintain it over 4.

2.  We would hold his Lasix for now and try to reduce the dose to avoid 
dehydration.

3.  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 
reintroduce a low-dose of metoprolol to try to taper him off of it without 
causing rebound.  He is hypertensive now.

4.  We would use amlodipine to try to control his blood pressure without 
excessive bradycardia as this might increase his edema.

5.  We considered a possibility that he has bleeding given his anemia on 
Xarelto. We may have to consider stopping the Xarelto until he has had an 
evaluation.

6.  We would repeat his hematocrit, troponin, and EKG.

7.  We can obtain an echocardiogram to evaluate for structural heart disease.

8.  I would continue to monitor.  If he has tachybrady syndrome despite 
adjustment of medications, he may benefit from a pacemaker, which he is 
agreeable to.

9.  We would obtain an abdominal ultrasound given his history of tobacco use 
and his age and hypertension.

 

Decision making is complex.

 

 [id number] #: [id number]

addendum: His iron /iron binding was low. Consider replacement rx.JENNFIER TAFOYA
done